# Patient Record
Sex: FEMALE | Race: WHITE | NOT HISPANIC OR LATINO | Employment: FULL TIME | URBAN - NONMETROPOLITAN AREA
[De-identification: names, ages, dates, MRNs, and addresses within clinical notes are randomized per-mention and may not be internally consistent; named-entity substitution may affect disease eponyms.]

---

## 2017-07-03 ENCOUNTER — OFFICE VISIT (OUTPATIENT)
Dept: OBSTETRICS AND GYNECOLOGY | Facility: CLINIC | Age: 47
End: 2017-07-03

## 2017-07-03 VITALS
BODY MASS INDEX: 31.23 KG/M2 | DIASTOLIC BLOOD PRESSURE: 98 MMHG | WEIGHT: 199 LBS | SYSTOLIC BLOOD PRESSURE: 110 MMHG | HEIGHT: 67 IN

## 2017-07-03 DIAGNOSIS — A63.0 GENITAL WARTS: Primary | ICD-10-CM

## 2017-07-03 PROCEDURE — 99203 OFFICE O/P NEW LOW 30 MIN: CPT | Performed by: OBSTETRICS & GYNECOLOGY

## 2017-07-03 RX ORDER — SODIUM CHLORIDE 0.9 % (FLUSH) 0.9 %
1-10 SYRINGE (ML) INJECTION AS NEEDED
Status: CANCELLED | OUTPATIENT
Start: 2017-07-12

## 2017-07-03 NOTE — H&P
[facility]  Rylie Sheridan  : 1970  MRN: [id number]  CSN: [id number]    History and Physical    Subjective   Rylie Sheridan is a 47 y.o. year old No obstetric history on file. who present for surgery due to genital and anal warts.  The patient noted the growths about 3 weeks ago.  She saw her primary care provider and was diagnosed with warts.  She is here to determine therapy..    There is no problem list on file for this patient.    Past Medical History:   Diagnosis Date   • Acute maxillary sinusitis    • Acute otitis media    • Acute serous otitis media    • Acute vaginitis    • Allergic rhinitis    • Crushing injury of hand     no fracture. swelling only      • Encounter for gynecological examination (general) (routine) without abnormal findings    • H/O mammogram 2015   • Menopausal flushing    • Rhinitis    • Temporomandibular joint pain dysfunction syndrome    • Upper respiratory infection    • Vaginal discharge      Past Surgical History:   Procedure Laterality Date   • CHOLECYSTECTOMY     • HYSTERECTOMY     • INJECTION OF MEDICATION  02/10/2016    Kenalog(3)   • NASAL SINUS SURGERY       Social History     Social History   • Marital status:      Spouse name: N/A   • Number of children: N/A   • Years of education: N/A     Social History Main Topics   • Smoking status: Former Smoker   • Smokeless tobacco: Never Used   • Alcohol use Yes      Comment: occasionally   • Drug use: No   • Sexual activity: Yes     Partners: Male     Birth control/ protection: Surgical     Other Topics Concern   • None     Social History Narrative       Current Outpatient Prescriptions:   •  azithromycin (ZITHROMAX) 250 MG tablet, Take 250 mg by mouth Daily. Take 2 tablets the first day, then 1 tablet daily for 4 days., Disp: , Rfl:   •  fluticasone (FLONASE) 50 MCG/ACT nasal spray, 2 sprays into each nostril Daily., Disp: , Rfl:     No Known Allergies    Review of Systems   Constitutional: Negative for activity  "change, appetite change, chills and fever.   Gastrointestinal: Negative for abdominal distention and abdominal pain.   Genitourinary: Negative for difficulty urinating, dysuria, flank pain, genital sores, pelvic pain, vaginal bleeding, vaginal discharge and vaginal pain.         Objective   /98  Ht 67\" (170.2 cm)  Wt 199 lb (90.3 kg)  LMP 07/03/2011  BMI 31.17 kg/m2  General: well developed; well nourished  no acute distress   Heart: regular rate and rhythm   Lungs: breathing is unlabored  clear to auscultation bilaterally   Abdomen: soft, non-tender; no masses  no umbilical or inginual hernias are present  no hepato-splenomegaly   Pelvis:: Clinical staff was present for exam  External genitalia:  normal appearance of the external genitalia including Bartholin's and [place]'s glands. There is a very thin condyloma along the right and left labia minora there is a growth at the posterior fourchette and there are multiple condyloma around the anal verge  :  urethral meatus normal; urethral hypermobility is absent.  Adnexa:  normal bimanual exam of the adnexa.  Rectal:  digital rectal exam not performed;   Labs  No results found for: HCG, PLT, HGB, HCT, WBC, NA, K, CL, CO2, BUN, CREATININE, GLUCOSE, ALBUMIN, CALCIUM, AST, ALT, BILITOT     Assessment   1. Genital and anal warts     Plan    1.  The patient will be taken to the operating room to undergo a laser ablation of her condyloma.  Today I discussed with Rylie the risks, benefits, and alternativesof  her upcoming surgical procedure. The risks that were discussed included, but  were not limited to: pain, bleeding including the need and risks of transfusion to  which she would have no objection, infection at the site of surgery, damage to  the adjacent surrounding organs,  catheter introduced urinary tract infections and  the small risk for deep vein thrombosis were all explained. Additionally, the small  risk for reoperation in the event of unanticipated " bleeding or surgical injury was  discussed.  All of her questions were answered fully.  She left with a very clear  understanding of the preoperative surgical indications and the nature of the  surgery for which she is scheduled.  She understands to be NPO after midnight.    Otoniel Penny MD  7/3/2017  2:34 PM

## 2017-07-07 ENCOUNTER — APPOINTMENT (OUTPATIENT)
Dept: PREADMISSION TESTING | Facility: HOSPITAL | Age: 47
End: 2017-07-07

## 2017-07-07 VITALS
OXYGEN SATURATION: 100 % | HEIGHT: 68 IN | RESPIRATION RATE: 16 BRPM | BODY MASS INDEX: 29.4 KG/M2 | WEIGHT: 194 LBS | HEART RATE: 84 BPM | DIASTOLIC BLOOD PRESSURE: 70 MMHG | SYSTOLIC BLOOD PRESSURE: 108 MMHG

## 2017-07-07 DIAGNOSIS — A63.0 GENITAL WARTS: ICD-10-CM

## 2017-07-07 LAB
BASOPHILS # BLD AUTO: 0.04 10*3/MM3 (ref 0–0.2)
BASOPHILS NFR BLD AUTO: 0.5 % (ref 0–2)
DEPRECATED RDW RBC AUTO: 40.5 FL (ref 36.4–46.3)
EOSINOPHIL # BLD AUTO: 0.09 10*3/MM3 (ref 0–0.7)
EOSINOPHIL NFR BLD AUTO: 1.1 % (ref 0–7)
ERYTHROCYTE [DISTWIDTH] IN BLOOD BY AUTOMATED COUNT: 12.8 % (ref 11.5–14.5)
HCT VFR BLD AUTO: 42.6 % (ref 35–45)
HGB BLD-MCNC: 14.9 G/DL (ref 12–15.5)
IMM GRANULOCYTES # BLD: 0.02 10*3/MM3 (ref 0–0.02)
IMM GRANULOCYTES NFR BLD: 0.3 % (ref 0–0.5)
LYMPHOCYTES # BLD AUTO: 2.6 10*3/MM3 (ref 0.6–4.2)
LYMPHOCYTES NFR BLD AUTO: 32.7 % (ref 10–50)
MCH RBC QN AUTO: 30.2 PG (ref 26.5–34)
MCHC RBC AUTO-ENTMCNC: 35 G/DL (ref 31.4–36)
MCV RBC AUTO: 86.2 FL (ref 80–98)
MONOCYTES # BLD AUTO: 0.63 10*3/MM3 (ref 0–0.9)
MONOCYTES NFR BLD AUTO: 7.9 % (ref 0–12)
NEUTROPHILS # BLD AUTO: 4.57 10*3/MM3 (ref 2–8.6)
NEUTROPHILS NFR BLD AUTO: 57.5 % (ref 37–80)
PLATELET # BLD AUTO: 256 10*3/MM3 (ref 150–450)
PMV BLD AUTO: 9.6 FL (ref 8–12)
RBC # BLD AUTO: 4.94 10*6/MM3 (ref 3.77–5.16)
WBC NRBC COR # BLD: 7.95 10*3/MM3 (ref 3.2–9.8)

## 2017-07-07 PROCEDURE — 36415 COLL VENOUS BLD VENIPUNCTURE: CPT

## 2017-07-07 PROCEDURE — 85025 COMPLETE CBC W/AUTO DIFF WBC: CPT | Performed by: OBSTETRICS & GYNECOLOGY

## 2017-07-07 RX ORDER — SODIUM CHLORIDE, SODIUM GLUCONATE, SODIUM ACETATE, POTASSIUM CHLORIDE, AND MAGNESIUM CHLORIDE 526; 502; 368; 37; 30 MG/100ML; MG/100ML; MG/100ML; MG/100ML; MG/100ML
1000 INJECTION, SOLUTION INTRAVENOUS CONTINUOUS PRN
Status: CANCELLED | OUTPATIENT
Start: 2017-07-12

## 2017-07-07 NOTE — DISCHARGE INSTRUCTIONS
[facility]  Pre-op Information and Guidelines    You will be called after 2 p.m. the day before your surgery (Friday for Monday surgery) and notified of your time for arrival and approximate surgery time.  If you have not received a call by 4P.M., please contact Same Day Surgery at [phone] of if outside [facility] call [phone].    Please Follow these Important Safety Guidelines:    • The morning of your procedure, take only the medications listed below with   A sip of water:_____________________________________________       ______________________________________________    • DO NOT eat or drink anything after 12:00 midnight the night before surgery  Specific instructions concerning drinking clear liquids will be discussed during  the pre-surgery instruction call the day before your surgery.    • If you take a blood thinner (ex. Plavix, Coumadin, aspirin), ask your doctor when to stop it before surgery  STOP DATE: _________________    • Only 2 visitors are allowed in patient rooms at a time  Your visitors will be asked to wait in the lobby until the admission process is complete with the exception of a parent with a child and patients in need of special assistance.    • YOU CANNOT DRIVE YOURSELF HOME  You must be accompanied by someone who will be responsible for driving you home after surgery and for your care at home.    • DO NOT chew gum, use breath mints, hard candy, or smoke the day of surgery  • DO NOT drink alcohol for at least 24 hours before your surgery  • DO NOT wear any jewelry and remove all body piercing before coming to the hospital  • DO NOT wear make-up to the hospital  • If you are having surgery on an extremity (arm/leg/foot) remove nail polish/artificial nails on the surgical side  • Clothing, glasses, contacts, dentures, and hairpieces must be removed before surgery  • Bathe the night before or the morning of your surgery and do not use powders/lotions on  skin.

## 2017-07-12 ENCOUNTER — ANESTHESIA EVENT (OUTPATIENT)
Dept: PERIOP | Facility: HOSPITAL | Age: 47
End: 2017-07-12

## 2017-07-12 ENCOUNTER — HOSPITAL ENCOUNTER (OUTPATIENT)
Facility: HOSPITAL | Age: 47
Setting detail: HOSPITAL OUTPATIENT SURGERY
Discharge: HOME | End: 2017-07-12
Attending: OBSTETRICS & GYNECOLOGY | Admitting: OBSTETRICS & GYNECOLOGY

## 2017-07-12 ENCOUNTER — ANESTHESIA (OUTPATIENT)
Dept: PERIOP | Facility: HOSPITAL | Age: 47
End: 2017-07-12

## 2017-07-12 VITALS
DIASTOLIC BLOOD PRESSURE: 77 MMHG | HEART RATE: 73 BPM | TEMPERATURE: 97.1 F | HEIGHT: 68 IN | WEIGHT: 195.33 LBS | RESPIRATION RATE: 18 BRPM | SYSTOLIC BLOOD PRESSURE: 120 MMHG | OXYGEN SATURATION: 100 % | BODY MASS INDEX: 29.6 KG/M2

## 2017-07-12 DIAGNOSIS — A63.0 GENITAL WARTS: ICD-10-CM

## 2017-07-12 PROCEDURE — 25010000002 HYDROMORPHONE PER 4 MG: Performed by: NURSE ANESTHETIST, CERTIFIED REGISTERED

## 2017-07-12 PROCEDURE — 56515 DESTROY VULVA LESION/S COMPL: CPT | Performed by: OBSTETRICS & GYNECOLOGY

## 2017-07-12 PROCEDURE — 25010000002 MIDAZOLAM PER 1 MG: Performed by: NURSE ANESTHETIST, CERTIFIED REGISTERED

## 2017-07-12 PROCEDURE — 88305 TISSUE EXAM BY PATHOLOGIST: CPT | Performed by: OBSTETRICS & GYNECOLOGY

## 2017-07-12 PROCEDURE — 88305 TISSUE EXAM BY PATHOLOGIST: CPT | Performed by: PATHOLOGY

## 2017-07-12 PROCEDURE — 25010000002 ONDANSETRON PER 1 MG: Performed by: NURSE ANESTHETIST, CERTIFIED REGISTERED

## 2017-07-12 PROCEDURE — 46917 LASER SURGERY ANAL LESIONS: CPT | Performed by: OBSTETRICS & GYNECOLOGY

## 2017-07-12 PROCEDURE — 25010000002 PROPOFOL 10 MG/ML EMULSION: Performed by: NURSE ANESTHETIST, CERTIFIED REGISTERED

## 2017-07-12 PROCEDURE — 25010000002 DEXAMETHASONE PER 1 MG: Performed by: NURSE ANESTHETIST, CERTIFIED REGISTERED

## 2017-07-12 PROCEDURE — 25010000002 FENTANYL CITRATE (PF) 100 MCG/2ML SOLUTION: Performed by: NURSE ANESTHETIST, CERTIFIED REGISTERED

## 2017-07-12 RX ORDER — PROMETHAZINE HYDROCHLORIDE 25 MG/ML
12.5 INJECTION, SOLUTION INTRAMUSCULAR; INTRAVENOUS ONCE AS NEEDED
Status: DISCONTINUED | OUTPATIENT
Start: 2017-07-12 | End: 2017-07-12 | Stop reason: HOSPADM

## 2017-07-12 RX ORDER — DEXAMETHASONE SODIUM PHOSPHATE 4 MG/ML
INJECTION, SOLUTION INTRA-ARTICULAR; INTRALESIONAL; INTRAMUSCULAR; INTRAVENOUS; SOFT TISSUE AS NEEDED
Status: DISCONTINUED | OUTPATIENT
Start: 2017-07-12 | End: 2017-07-12 | Stop reason: SURG

## 2017-07-12 RX ORDER — ONDANSETRON 2 MG/ML
4 INJECTION INTRAMUSCULAR; INTRAVENOUS ONCE AS NEEDED
Status: DISCONTINUED | OUTPATIENT
Start: 2017-07-12 | End: 2017-07-12 | Stop reason: HOSPADM

## 2017-07-12 RX ORDER — EPHEDRINE SULFATE 50 MG/ML
5 INJECTION, SOLUTION INTRAVENOUS ONCE AS NEEDED
Status: DISCONTINUED | OUTPATIENT
Start: 2017-07-12 | End: 2017-07-12 | Stop reason: HOSPADM

## 2017-07-12 RX ORDER — SODIUM CHLORIDE 0.9 % (FLUSH) 0.9 %
1-10 SYRINGE (ML) INJECTION AS NEEDED
Status: DISCONTINUED | OUTPATIENT
Start: 2017-07-12 | End: 2017-07-12 | Stop reason: HOSPADM

## 2017-07-12 RX ORDER — OXYCODONE HYDROCHLORIDE AND ACETAMINOPHEN 5; 325 MG/1; MG/1
1 TABLET ORAL ONCE AS NEEDED
Status: DISCONTINUED | OUTPATIENT
Start: 2017-07-12 | End: 2017-07-12 | Stop reason: HOSPADM

## 2017-07-12 RX ORDER — FENTANYL CITRATE 50 UG/ML
INJECTION, SOLUTION INTRAMUSCULAR; INTRAVENOUS AS NEEDED
Status: DISCONTINUED | OUTPATIENT
Start: 2017-07-12 | End: 2017-07-12 | Stop reason: SURG

## 2017-07-12 RX ORDER — IBUPROFEN 600 MG/1
600 TABLET ORAL EVERY 6 HOURS PRN
Qty: 30 TABLET | Refills: 1 | Status: SHIPPED | OUTPATIENT
Start: 2017-07-12 | End: 2017-08-03

## 2017-07-12 RX ORDER — MIDAZOLAM HYDROCHLORIDE 1 MG/ML
INJECTION INTRAMUSCULAR; INTRAVENOUS AS NEEDED
Status: DISCONTINUED | OUTPATIENT
Start: 2017-07-12 | End: 2017-07-12 | Stop reason: SURG

## 2017-07-12 RX ORDER — ACETAMINOPHEN 650 MG/1
650 SUPPOSITORY RECTAL ONCE AS NEEDED
Status: DISCONTINUED | OUTPATIENT
Start: 2017-07-12 | End: 2017-07-12 | Stop reason: HOSPADM

## 2017-07-12 RX ORDER — LABETALOL HYDROCHLORIDE 5 MG/ML
5 INJECTION, SOLUTION INTRAVENOUS
Status: DISCONTINUED | OUTPATIENT
Start: 2017-07-12 | End: 2017-07-12 | Stop reason: HOSPADM

## 2017-07-12 RX ORDER — OXYCODONE HYDROCHLORIDE AND ACETAMINOPHEN 5; 325 MG/1; MG/1
1-2 TABLET ORAL EVERY 4 HOURS PRN
Qty: 30 TABLET | Refills: 0 | Status: SHIPPED | OUTPATIENT
Start: 2017-07-12 | End: 2017-07-20

## 2017-07-12 RX ORDER — ACETAMINOPHEN 325 MG/1
650 TABLET ORAL ONCE AS NEEDED
Status: DISCONTINUED | OUTPATIENT
Start: 2017-07-12 | End: 2017-07-12 | Stop reason: HOSPADM

## 2017-07-12 RX ORDER — SODIUM CHLORIDE, SODIUM GLUCONATE, SODIUM ACETATE, POTASSIUM CHLORIDE, AND MAGNESIUM CHLORIDE 526; 502; 368; 37; 30 MG/100ML; MG/100ML; MG/100ML; MG/100ML; MG/100ML
1000 INJECTION, SOLUTION INTRAVENOUS CONTINUOUS PRN
Status: DISCONTINUED | OUTPATIENT
Start: 2017-07-12 | End: 2017-07-12 | Stop reason: HOSPADM

## 2017-07-12 RX ORDER — ONDANSETRON 2 MG/ML
INJECTION INTRAMUSCULAR; INTRAVENOUS AS NEEDED
Status: DISCONTINUED | OUTPATIENT
Start: 2017-07-12 | End: 2017-07-12 | Stop reason: SURG

## 2017-07-12 RX ORDER — IBUPROFEN 800 MG/1
800 TABLET ORAL EVERY 6 HOURS
Status: DISCONTINUED | OUTPATIENT
Start: 2017-07-12 | End: 2017-07-12 | Stop reason: HOSPADM

## 2017-07-12 RX ORDER — LIDOCAINE HYDROCHLORIDE 20 MG/ML
INJECTION, SOLUTION INFILTRATION; PERINEURAL AS NEEDED
Status: DISCONTINUED | OUTPATIENT
Start: 2017-07-12 | End: 2017-07-12 | Stop reason: SURG

## 2017-07-12 RX ORDER — NALOXONE HCL 0.4 MG/ML
0.2 VIAL (ML) INJECTION AS NEEDED
Status: DISCONTINUED | OUTPATIENT
Start: 2017-07-12 | End: 2017-07-12 | Stop reason: HOSPADM

## 2017-07-12 RX ORDER — DIPHENHYDRAMINE HYDROCHLORIDE 50 MG/ML
12.5 INJECTION INTRAMUSCULAR; INTRAVENOUS
Status: DISCONTINUED | OUTPATIENT
Start: 2017-07-12 | End: 2017-07-12 | Stop reason: HOSPADM

## 2017-07-12 RX ORDER — PROPOFOL 10 MG/ML
VIAL (ML) INTRAVENOUS AS NEEDED
Status: DISCONTINUED | OUTPATIENT
Start: 2017-07-12 | End: 2017-07-12 | Stop reason: SURG

## 2017-07-12 RX ORDER — FLUMAZENIL 0.1 MG/ML
0.2 INJECTION INTRAVENOUS AS NEEDED
Status: DISCONTINUED | OUTPATIENT
Start: 2017-07-12 | End: 2017-07-12 | Stop reason: HOSPADM

## 2017-07-12 RX ADMIN — SODIUM CHLORIDE, SODIUM GLUCONATE, SODIUM ACETATE, POTASSIUM CHLORIDE, AND MAGNESIUM CHLORIDE 1000 ML: 526; 502; 368; 37; 30 INJECTION, SOLUTION INTRAVENOUS at 07:28

## 2017-07-12 RX ADMIN — MIDAZOLAM 2 MG: 1 INJECTION INTRAMUSCULAR; INTRAVENOUS at 09:17

## 2017-07-12 RX ADMIN — ONDANSETRON 4 MG: 2 INJECTION INTRAMUSCULAR; INTRAVENOUS at 09:44

## 2017-07-12 RX ADMIN — PROPOFOL 150 MG: 10 INJECTION, EMULSION INTRAVENOUS at 09:24

## 2017-07-12 RX ADMIN — HYDROMORPHONE HYDROCHLORIDE 0.5 MG: 1 INJECTION, SOLUTION INTRAMUSCULAR; INTRAVENOUS; SUBCUTANEOUS at 10:16

## 2017-07-12 RX ADMIN — DEXAMETHASONE SODIUM PHOSPHATE 4 MG: 4 INJECTION, SOLUTION INTRAMUSCULAR; INTRAVENOUS at 09:27

## 2017-07-12 RX ADMIN — FENTANYL CITRATE 100 MCG: 50 INJECTION, SOLUTION INTRAMUSCULAR; INTRAVENOUS at 09:19

## 2017-07-12 RX ADMIN — LIDOCAINE HYDROCHLORIDE 80 MG: 20 INJECTION, SOLUTION INFILTRATION; PERINEURAL at 09:24

## 2017-07-12 NOTE — PLAN OF CARE
Problem: Patient Care Overview (Adult)  Goal: Adult Individualization and Mutuality  Outcome: Outcome(s) achieved Date Met:  07/12/17  Goal: Discharge Needs Assessment  Outcome: Outcome(s) achieved Date Met:  07/12/17

## 2017-07-12 NOTE — ANESTHESIA POSTPROCEDURE EVALUATION
Patient: Rylie Sheridan    Procedure Summary     Date Anesthesia Start Anesthesia Stop Room / Location    07/12/17 0917 0950  [facility] OR 10 / [facility]       Procedure Diagnosis Surgeon Provider    LASER ABLTION OF CONDYLOMA (N/A Vagina) Genital warts  (Genital warts [A63.0]) MD Michelle Mojica MD          Anesthesia Type: general  Last vitals  BP      Temp      Pulse     Resp      SpO2        Post Anesthesia Care and Evaluation    Patient location during evaluation: PACU  Patient participation: complete - patient participated  Level of consciousness: awake and awake and alert  Pain management: adequate  Airway patency: patent  Anesthetic complications: No anesthetic complications  PONV Status: none  Cardiovascular status: acceptable  Respiratory status: acceptable  Hydration status: acceptable

## 2017-07-12 NOTE — H&P (VIEW-ONLY)
[facility]  Rylie Sheridan  : 1970  MRN: [id number]  CSN: [id number]    History and Physical    Subjective   Rylie Sheridan is a 47 y.o. year old No obstetric history on file. who present for surgery due to genital and anal warts.  The patient noted the growths about 3 weeks ago.  She saw her primary care provider and was diagnosed with warts.  She is here to determine therapy..    There is no problem list on file for this patient.    Past Medical History:   Diagnosis Date   • Acute maxillary sinusitis    • Acute otitis media    • Acute serous otitis media    • Acute vaginitis    • Allergic rhinitis    • Crushing injury of hand     no fracture. swelling only      • Encounter for gynecological examination (general) (routine) without abnormal findings    • H/O mammogram 2015   • Menopausal flushing    • Rhinitis    • Temporomandibular joint pain dysfunction syndrome    • Upper respiratory infection    • Vaginal discharge      Past Surgical History:   Procedure Laterality Date   • CHOLECYSTECTOMY     • HYSTERECTOMY     • INJECTION OF MEDICATION  02/10/2016    Kenalog(3)   • NASAL SINUS SURGERY       Social History     Social History   • Marital status:      Spouse name: N/A   • Number of children: N/A   • Years of education: N/A     Social History Main Topics   • Smoking status: Former Smoker   • Smokeless tobacco: Never Used   • Alcohol use Yes      Comment: occasionally   • Drug use: No   • Sexual activity: Yes     Partners: Male     Birth control/ protection: Surgical     Other Topics Concern   • None     Social History Narrative       Current Outpatient Prescriptions:   •  azithromycin (ZITHROMAX) 250 MG tablet, Take 250 mg by mouth Daily. Take 2 tablets the first day, then 1 tablet daily for 4 days., Disp: , Rfl:   •  fluticasone (FLONASE) 50 MCG/ACT nasal spray, 2 sprays into each nostril Daily., Disp: , Rfl:     No Known Allergies    Review of Systems   Constitutional: Negative for activity  "change, appetite change, chills and fever.   Gastrointestinal: Negative for abdominal distention and abdominal pain.   Genitourinary: Negative for difficulty urinating, dysuria, flank pain, genital sores, pelvic pain, vaginal bleeding, vaginal discharge and vaginal pain.         Objective   /98  Ht 67\" (170.2 cm)  Wt 199 lb (90.3 kg)  LMP 07/03/2011  BMI 31.17 kg/m2  General: well developed; well nourished  no acute distress   Heart: regular rate and rhythm   Lungs: breathing is unlabored  clear to auscultation bilaterally   Abdomen: soft, non-tender; no masses  no umbilical or inginual hernias are present  no hepato-splenomegaly   Pelvis:: Clinical staff was present for exam  External genitalia:  normal appearance of the external genitalia including Bartholin's and [place]'s glands. There is a very thin condyloma along the right and left labia minora there is a growth at the posterior fourchette and there are multiple condyloma around the anal verge  :  urethral meatus normal; urethral hypermobility is absent.  Adnexa:  normal bimanual exam of the adnexa.  Rectal:  digital rectal exam not performed;   Labs  No results found for: HCG, PLT, HGB, HCT, WBC, NA, K, CL, CO2, BUN, CREATININE, GLUCOSE, ALBUMIN, CALCIUM, AST, ALT, BILITOT     Assessment   1. Genital and anal warts     Plan    1.  The patient will be taken to the operating room to undergo a laser ablation of her condyloma.  Today I discussed with Rylie the risks, benefits, and alternativesof  her upcoming surgical procedure. The risks that were discussed included, but  were not limited to: pain, bleeding including the need and risks of transfusion to  which she would have no objection, infection at the site of surgery, damage to  the adjacent surrounding organs,  catheter introduced urinary tract infections and  the small risk for deep vein thrombosis were all explained. Additionally, the small  risk for reoperation in the event of unanticipated " bleeding or surgical injury was  discussed.  All of her questions were answered fully.  She left with a very clear  understanding of the preoperative surgical indications and the nature of the  surgery for which she is scheduled.  She understands to be NPO after midnight.    Otoniel Penny MD  7/3/2017  2:34 PM

## 2017-07-12 NOTE — OP NOTE
Problem diagnosis: Genital and perianal warts    Postoperative diagnosis: same    Procedure: Laser ablation of condyloma    Surgeon: Otoniel Penny M.D.    Assistant: Barbi Tipton CSA    Anesthesia: Gen. by LMA    Counts: Correct    Complications: None    Specimens: Vulvar and perianal condyloma sent to pathology    Findings: Large amount of condyloma in the perianal region several smaller condyloma in the posterior fourchette 1 beneath the clitoris and several around the clitoral barrera    Technique: After informed consent was obtained, and after the operative consent was signed in order, the patient was brought to the operating room and placed on the operating room table in the supine position in preparation for her surgery.  After an adequate level of general anesthesia was noted, [facility]  A timeout was performed.  Moistened towels were then placed around the operative site.  4 condyloma within grasped with pickups and excised using the needlepoint Bovie.  These were sent to pathology.  The remaining condyloma were then ablated using the laser, with the laser set on 10.  Once this was completed, Silvadene cream was applied to the burnt areas.  The patient tolerates the procedure well and will go to the recovery room in stable condition.          This document has been electronically signed by Otoniel Penny MD on July 12, 2017 9:59 AM

## 2017-07-12 NOTE — PLAN OF CARE
Problem: Patient Care Overview (Adult)  Goal: Plan of Care Review  Outcome: Outcome(s) achieved Date Met:  07/12/17 07/12/17 1002   Coping/Psychosocial Response Interventions   Plan Of Care Reviewed With patient   Patient Care Overview   Progress progress toward functional goals as expected   Outcome Evaluation   Outcome Summary/Follow up Plan No c/o of pain. VSS. Meets pacu d/c criteria.         Problem: Perioperative Period (Adult)  Goal: Signs and Symptoms of Listed Potential Problems Will be Absent or Manageable (Perioperative Period)  Outcome: Outcome(s) achieved Date Met:  07/12/17 07/12/17 1002   Perioperative Period   Problems Assessed (Perioperative Period) all   Problems Present (Perioperative Period) none

## 2017-07-12 NOTE — BRIEF OP NOTE
LOOP ELECTROCAUTERY EXCISION PROCEDURE BIOPSY OF CERVIX WITH/WITHOUT LASER  Procedure Note    Rylie Sheridan  7/12/2017    Pre-op Diagnosis:   Genital warts [A63.0]    Post-op Diagnosis:     Post-Op Diagnosis Codes:     * Genital warts [A63.0]    Procedure/CPT® Codes:      Procedure(s):  LASER ABLTION OF CONDYLOMA    Surgeon(s):  Otoniel Penny MD    Anesthesia: General    Staff:   Circulator: Julee Motta RN; Kely Barclay RN  Laser Staff: Angeli Atwood RN  Scrub Person: Main Alejandre  Assistant: Barbi Tipton CSA    Estimated Blood Loss: *No blood loss documented*  Urine Voided: * No values recorded between 7/12/2017  9:16 AM and 7/12/2017  9:39 AM *    Specimens:                  ID Type Source Tests Collected by Time Destination   A : vulvar andper-anal condylomas Tissue Condyloma TISSUE EXAM Otoniel Penny MD 7/12/2017 0936          Drains:           Findings: see op note    Complications: none      Otoniel Penny MD     Date: 7/12/2017  Time: 9:42 AM

## 2017-07-12 NOTE — ANESTHESIA PROCEDURE NOTES
Airway  Urgency: elective    Airway not difficult    General Information and Staff    Patient location during procedure: OR  CRNA: JOSHUA CISNEROS    Indications and Patient Condition    Preoxygenated: yes  Mask difficulty assessment: 0 - not attempted    Final Airway Details  Final airway type: supraglottic airway      Successful airway: classic  Size 4    Number of attempts at approach: 1    Additional Comments  Lips and teeth in preanesthetic condition

## 2017-07-13 LAB
LAB AP CASE REPORT: NORMAL
Lab: NORMAL
PATH REPORT.FINAL DX SPEC: NORMAL
PATH REPORT.GROSS SPEC: NORMAL

## 2017-07-20 ENCOUNTER — OFFICE VISIT (OUTPATIENT)
Dept: OBSTETRICS AND GYNECOLOGY | Facility: CLINIC | Age: 47
End: 2017-07-20

## 2017-07-20 DIAGNOSIS — Z09 SURGICAL FOLLOW-UP CARE: Primary | ICD-10-CM

## 2017-07-20 PROCEDURE — 99024 POSTOP FOLLOW-UP VISIT: CPT | Performed by: OBSTETRICS & GYNECOLOGY

## 2017-07-20 NOTE — PROGRESS NOTES
The patient resents for 1 week postop check following laser ablation of condyloma.  She's doing well and has no complaints.    On exam: In general well-developed well-nourished female in no acute distress  External genitalia: The ablation sites appear to be healing up well    Assessment: Normal 1 week postop check    Plan: recheck in 2 weeks          This document has been electronically signed by Otoniel Penny MD on July 20, 2017 2:02 PM

## 2017-08-03 ENCOUNTER — OFFICE VISIT (OUTPATIENT)
Dept: OBSTETRICS AND GYNECOLOGY | Facility: CLINIC | Age: 47
End: 2017-08-03

## 2017-08-03 DIAGNOSIS — Z09 SURGICAL FOLLOW-UP CARE: Primary | ICD-10-CM

## 2017-08-03 PROCEDURE — 99024 POSTOP FOLLOW-UP VISIT: CPT | Performed by: OBSTETRICS & GYNECOLOGY

## 2017-08-03 NOTE — PROGRESS NOTES
The patient presents for a three-week postop check following laser ablation of condyloma.  She's doing well but thinks she has a new condyloma growing.    On exam in general well-developed well-nourished female in no acute distress   External genitalia: The ablation sites are healing well and the anal area that is a possible new condyloma at the 3 o'clock position    Assessment: Three-week postoperative check    Plan: Recheck in a month if it appears to condyloma has returned, we could apply acid at that time.          This document has been electronically signed by Otoniel Penny MD on August 3, 2017 3:20 PM

## 2017-08-29 ENCOUNTER — OFFICE VISIT (OUTPATIENT)
Dept: OBSTETRICS AND GYNECOLOGY | Facility: CLINIC | Age: 47
End: 2017-08-29

## 2017-08-29 DIAGNOSIS — A63.0 GENITAL WARTS: ICD-10-CM

## 2017-08-29 DIAGNOSIS — Z09 SURGICAL FOLLOW-UP CARE: Primary | ICD-10-CM

## 2017-08-29 PROCEDURE — 99024 POSTOP FOLLOW-UP VISIT: CPT | Performed by: OBSTETRICS & GYNECOLOGY

## 2017-08-29 RX ORDER — IMIQUIMOD 12.5 MG/.25G
CREAM TOPICAL 3 TIMES WEEKLY
Qty: 12 PACKET | Refills: 2 | Status: SHIPPED | OUTPATIENT
Start: 2017-08-30 | End: 2017-11-19 | Stop reason: SDUPTHER

## 2017-08-29 NOTE — PROGRESS NOTES
The patient presents for 6 week postop check following laser ablation of condyloma.  She notes she has new condyloma present.    On exam: In general, a well-developed well-nourished female in no acute distress  External genitalia.  The ablation sites of completely healed up, she has new condyloma in multiple locations present about the anus.    Assessment: Six-week postoperative check with new condyloma    Plan: The patient would like to try Aldara cream at home.  Follow-up as needed.          This document has been electronically signed by Otoniel Penny MD on August 29, 2017 3:27 PM

## 2017-11-20 RX ORDER — IMIQUIMOD 12.5 MG/.25G
CREAM TOPICAL
Qty: 12 G | Refills: 0 | Status: SHIPPED | OUTPATIENT
Start: 2017-11-20 | End: 2017-12-10 | Stop reason: SDUPTHER

## 2017-12-11 RX ORDER — IMIQUIMOD 12.5 MG/.25G
CREAM TOPICAL
Qty: 12 G | Refills: 0 | Status: SHIPPED | OUTPATIENT
Start: 2017-12-11 | End: 2021-09-10

## 2021-09-10 ENCOUNTER — OFFICE VISIT (OUTPATIENT)
Dept: FAMILY MEDICINE CLINIC | Facility: CLINIC | Age: 51
End: 2021-09-10

## 2021-09-10 VITALS
OXYGEN SATURATION: 98 % | HEART RATE: 80 BPM | WEIGHT: 215.6 LBS | RESPIRATION RATE: 18 BRPM | BODY MASS INDEX: 32.67 KG/M2 | DIASTOLIC BLOOD PRESSURE: 74 MMHG | TEMPERATURE: 97.1 F | SYSTOLIC BLOOD PRESSURE: 120 MMHG | HEIGHT: 68 IN

## 2021-09-10 DIAGNOSIS — Z12.31 BREAST CANCER SCREENING BY MAMMOGRAM: ICD-10-CM

## 2021-09-10 DIAGNOSIS — Z00.00 ROUTINE GENERAL MEDICAL EXAMINATION AT A HEALTH CARE FACILITY: Primary | ICD-10-CM

## 2021-09-10 DIAGNOSIS — Z78.0 MENOPAUSE: ICD-10-CM

## 2021-09-10 PROCEDURE — 99386 PREV VISIT NEW AGE 40-64: CPT | Performed by: FAMILY MEDICINE

## 2021-09-10 RX ORDER — ESTRADIOL 2 MG/1
2 RING VAGINAL
Qty: 3 EACH | Refills: 0 | Status: SHIPPED | OUTPATIENT
Start: 2021-09-10 | End: 2021-09-27 | Stop reason: ALTCHOICE

## 2021-09-10 RX ORDER — ESTRADIOL 2 MG/1
RING VAGINAL
COMMUNITY
Start: 2021-08-31 | End: 2021-09-10 | Stop reason: SDUPTHER

## 2021-09-10 NOTE — PATIENT INSTRUCTIONS
Mediterranean Diet  A Mediterranean diet refers to food and lifestyle choices that are based on the traditions of countries located on the [place]. This way of eating has been shown to help prevent certain conditions and improve outcomes for people who have chronic diseases, like kidney disease and heart disease.  What are tips for following this plan?  Lifestyle  · Cook and eat meals together with your family, when possible.  · Drink enough fluid to keep your urine clear or pale yellow.  · Be physically active every day. This includes:  ? Aerobic exercise like running or swimming.  ? Leisure activities like gardening, walking, or housework.  · Get 7-8 hours of sleep each night.  · If recommended by your health care provider, drink red wine in moderation. This means 1 glass a day for nonpregnant women and 2 glasses a day for men. A glass of wine equals 5 oz (150 mL).  Reading food labels    · Check the serving size of packaged foods. For foods such as rice and pasta, the serving size refers to the amount of cooked product, not dry.  · Check the total fat in packaged foods. Avoid foods that have saturated fat or trans fats.  · Check the ingredients list for added sugars, such as corn syrup.    Shopping  · At the grocery store, buy most of your food from the areas near the walls of the store. This includes:  ? Fresh fruits and vegetables (produce).  ? Grains, beans, nuts, and seeds. Some of these may be available in unpackaged forms or large amounts (in bulk).  ? Fresh seafood.  ? Poultry and eggs.  ? Low-fat dairy products.  · Buy whole ingredients instead of prepackaged foods.  · Buy fresh fruits and vegetables in-season from local farmers markets.  · Buy frozen fruits and vegetables in resealable bags.  · If you do not have access to quality fresh seafood, buy precooked frozen shrimp or canned fish, such as tuna, salmon, or sardines.  · Buy small amounts of raw or cooked vegetables, salads, or olives from  the deli or salad bar at your store.  · Stock your pantry so you always have certain foods on hand, such as olive oil, canned tuna, canned tomatoes, rice, pasta, and beans.  Cooking  · Cook foods with extra-virgin olive oil instead of using butter or other vegetable oils.  · Have meat as a side dish, and have vegetables or grains as your main dish. This means having meat in small portions or adding small amounts of meat to foods like pasta or stew.  · Use beans or vegetables instead of meat in common dishes like chili or lasagna.  · [place] with different cooking methods. Try roasting or broiling vegetables instead of steaming or sautéeing them.  · Add frozen vegetables to soups, stews, pasta, or rice.  · Add nuts or seeds for added healthy fat at each meal. You can add these to yogurt, salads, or vegetable dishes.  · Marinate fish or vegetables using olive oil, lemon juice, garlic, and fresh herbs.  Meal planning    · Plan to eat 1 vegetarian meal one day each week. Try to work up to 2 vegetarian meals, if possible.  · Eat seafood 2 or more times a week.  · Have healthy snacks readily available, such as:  ? Vegetable sticks with hummus.  ? Greek yogurt.  ? Fruit and nut trail mix.  · Eat balanced meals throughout the week. This includes:  ? Fruit: 2-3 servings a day  ? Vegetables: 4-5 servings a day  ? Low-fat dairy: 2 servings a day  ? Fish, poultry, or lean meat: 1 serving a day  ? Beans and legumes: 2 or more servings a week  ? Nuts and seeds: 1-2 servings a day  ? Whole grains: 6-8 servings a day  ? Extra-virgin olive oil: 3-4 servings a day  · Limit red meat and sweets to only a few servings a month    What are my food choices?  · Mediterranean diet  ? Recommended  § Grains: Whole-grain pasta. Brown rice. Bulgar wheat. Polenta. Couscous. Whole-wheat bread. Oatmeal. Quinoa.  § Vegetables: Artichokes. Beets. Broccoli. Cabbage. Carrots. Eggplant. Green beans. Chard. Kale. Spinach. Onions. Leeks. Peas. Squash.  Tomatoes. Peppers. Radishes.  § Fruits: Apples. Apricots. Avocado. Berries. Bananas. Cherries. Dates. Figs. Grapes. [place]. Melon. Oranges. Peaches. Plums. Pomegranate.  § Meats and other protein foods: Beans. Almonds. Sunflower seeds. Pine nuts. Peanuts. Cod. [place]. Scallops. Shrimp. Tuna. Tilapia. Clams. Oysters. Eggs.  § Dairy: Low-fat milk. Cheese. Greek yogurt.  § Beverages: Water. Red wine. Herbal tea.  § Fats and oils: Extra virgin olive oil. Avocado oil. Grape seed oil.  § Sweets and desserts: Greek yogurt with honey. Baked apples. Poached pears. Trail mix.  § Seasoning and other foods: Basil. Cilantro. Coriander. Cumin. Mint. Parsley. Joni. Rosemary. Tarragon. Garlic. Oregano. Thyme. Pepper. Balsalmic vinegar. Tahini. Hummus. Tomato sauce. Olives. Mushrooms.  ? Limit these  § Grains: Prepackaged pasta or rice dishes. Prepackaged cereal with added sugar.  § Vegetables: Deep fried potatoes (french fries).  § Fruits: Fruit canned in syrup.  § Meats and other protein foods: Beef. Pork. Lamb. Poultry with skin. Hot dogs. [place].  § Dairy: Ice cream. Sour cream. Whole milk.  § Beverages: Juice. Sugar-sweetened soft drinks. Beer. Liquor and spirits.  § Fats and oils: Butter. Canola oil. Vegetable oil. Beef fat (tallow). Lard.  § Sweets and desserts: Cookies. Cakes. Pies. Candy.  § Seasoning and other foods: Mayonnaise. Premade sauces and marinades.  The items listed may not be a complete list. Talk with your dietitian about what dietary choices are right for you.  Summary  · The Mediterranean diet includes both food and lifestyle choices.  · Eat a variety of fresh fruits and vegetables, beans, nuts, seeds, and whole grains.  · Limit the amount of red meat and sweets that you eat.  · Talk with your health care provider about whether it is safe for you to drink red wine in moderation. This means 1 glass a day for nonpregnant women and 2 glasses a day for men. A glass of wine equals 5 oz (150 mL).  This information  is not intended to replace advice given to you by your health care provider. Make sure you discuss any questions you have with your health care provider.  Document Revised: 08/17/2017 Document Reviewed: 08/10/2017  [employer] Patient Education © 2020 [employer]  Exercising to Stay Healthy  To become healthy and stay healthy, it is recommended that you do moderate-intensity and vigorous-intensity exercise. You can tell that you are exercising at a moderate intensity if your heart starts beating faster and you start breathing faster but can still hold a conversation. You can tell that you are exercising at a vigorous intensity if you are breathing much harder and faster and cannot hold a conversation while exercising.  Exercising regularly is important. It has many health benefits, such as:  · Improving overall fitness, flexibility, and endurance.  · Increasing bone density.  · Helping with weight control.  · Decreasing body fat.  · Increasing muscle strength.  · Reducing stress and tension.  · Improving overall health.  How often should I exercise?  Choose an activity that you enjoy, and set realistic goals. Your health care provider can help you make an activity plan that works for you.  Exercise regularly as told by your health care provider. This may include:  · Doing strength training two times a week, such as:  ? Lifting weights.  ? Using resistance bands.  ? Push-ups.  ? Sit-ups.  ? Yoga.  · Doing a certain intensity of exercise for a given amount of time. Choose from these options:  ? A total of 150 minutes of moderate-intensity exercise every week.  ? A total of 75 minutes of vigorous-intensity exercise every week.  ? A mix of moderate-intensity and vigorous-intensity exercise every week.  Children, pregnant women, people who have not exercised regularly, people who are overweight, and older adults may need to talk with a health care provider about what activities are safe to do. If you have a medical  condition, be sure to talk with your health care provider before you start a new exercise program.  What are some exercise ideas?  Moderate-intensity exercise ideas include:  · Walking 1 mile (1.6 km) in about 15 minutes.  · Biking.  · Hiking.  · Golfing.  · Dancing.  · Water aerobics.  Vigorous-intensity exercise ideas include:  · Walking 4.5 miles (7.2 km) or more in about 1 hour.  · Jogging or running 5 miles (8 km) in about 1 hour.  · Biking 10 miles (16.1 km) or more in about 1 hour.  · Lap swimming.  · Roller-skating or in-line skating.  · Cross-country skiing.  · Vigorous competitive sports, such as football, basketball, and soccer.  · Jumping rope.  · Aerobic dancing.  What are some everyday activities that can help me to get exercise?  · Yard work, such as:  ? Pushing a .  ? Raking and bagging leaves.  · Washing your car.  · Pushing a stroller.  · Shoveling snow.  · Gardening.  · Washing windows or floors.  How can I be more active in my day-to-day activities?  · Use stairs instead of an elevator.  · Take a walk during your lunch break.  · If you drive, park your car farther away from your work or school.  · If you take public transportation, get off one stop early and walk the rest of the way.  · Stand up or walk around during all of your indoor phone calls.  · Get up, stretch, and walk around every 30 minutes throughout the day.  · Enjoy exercise with a friend. Support to continue exercising will help you keep a regular routine of activity.  What guidelines can I follow while exercising?  · Before you start a new exercise program, talk with your health care provider.  · Do not exercise so much that you hurt yourself, feel dizzy, or get very short of breath.  · Wear comfortable clothes and wear shoes with good support.  · Drink plenty of water while you exercise to prevent dehydration or heat stroke.  · Work out until your breathing and your heartbeat get faster.  Where to find more  information  · [place] Department of Health and Human Services: www.hhs.gov  · Centers for Disease Control and Prevention (CDC): www.cdc.gov  Summary  · Exercising regularly is important. It will improve your overall fitness, flexibility, and endurance.  · Regular exercise also will improve your overall health. It can help you control your weight, reduce stress, and improve your bone density.  · Do not exercise so much that you hurt yourself, feel dizzy, or get very short of breath.  · Before you start a new exercise program, talk with your health care provider.  This information is not intended to replace advice given to you by your health care provider. Make sure you discuss any questions you have with your health care provider.  Document Revised: 11/30/2018 Document Reviewed: 11/08/2018  [employer] Patient Education © 2021 [employer]

## 2021-09-10 NOTE — PROGRESS NOTES
Subjective   Rylie Salazar is a 51 y.o. female.     51-year-old female perimenopausal, due a mammogram and complete physical      The following portions of the patient's history were reviewed and updated as appropriate: allergies, current medications, past family history, past medical history, past social history, past surgical history and problem list.    Review of Systems   Cardiovascular: Negative for chest pain and leg swelling.   Genitourinary: Negative for vaginal discharge.   Neurological: Negative for headaches.       Objective   Physical Exam  Vitals and nursing note reviewed.   Constitutional:       Appearance: She is obese.   Neck:      Vascular: No carotid bruit.   Cardiovascular:      Rate and Rhythm: Normal rate and regular rhythm.      Pulses: Normal pulses.      Heart sounds: Normal heart sounds.   Pulmonary:      Effort: Pulmonary effort is normal.      Breath sounds: Normal breath sounds.   Abdominal:      General: Abdomen is flat.      Palpations: Abdomen is soft.   Musculoskeletal:      Right lower leg: No edema.      Left lower leg: No edema.   Lymphadenopathy:      Cervical: No cervical adenopathy.   Neurological:      General: No focal deficit present.      Mental Status: She is alert and oriented to person, place, and time.   Psychiatric:         Mood and Affect: Mood normal.         Behavior: Behavior normal.         Thought Content: Thought content normal.         Judgment: Judgment normal.         Assessment/Plan   Diagnoses and all orders for this visit:    1. Routine general medical examination at a health care facility (Primary)  -     TSH  -     T4, free  -     CBC & Differential  -     Comprehensive Metabolic Panel  -     Lipid Panel  -     Hepatitis C Antibody    2. Breast cancer screening by mammogram  -     Mammo Screening Digital Tomosynthesis Bilateral With CAD; Future    3. Menopause  -     FSH & LH    Other orders  -     estradiol (Estring) 2 MG vaginal ring; Insert 2 mg into  the vagina Every 3 (Three) Months for 91 days. follow package directions  Dispense: 3 each; Refill: 0         Plan above-return for complete physical and possible hormonal supplements after mammogram etc. patient will need Pap smear and vaginal smear

## 2021-09-11 LAB
ALBUMIN SERPL-MCNC: 4.6 G/DL (ref 3.5–5.2)
ALBUMIN/GLOB SERPL: 2.1 G/DL
ALP SERPL-CCNC: 93 U/L (ref 39–117)
ALT SERPL-CCNC: 37 U/L (ref 1–33)
AST SERPL-CCNC: 29 U/L (ref 1–32)
BASOPHILS # BLD AUTO: 0.08 10*3/MM3 (ref 0–0.2)
BASOPHILS NFR BLD AUTO: 1 % (ref 0–1.5)
BILIRUB SERPL-MCNC: 0.4 MG/DL (ref 0–1.2)
BUN SERPL-MCNC: 10 MG/DL (ref 6–20)
BUN/CREAT SERPL: 12.7 (ref 7–25)
CALCIUM SERPL-MCNC: 9.5 MG/DL (ref 8.6–10.5)
CHLORIDE SERPL-SCNC: 101 MMOL/L (ref 98–107)
CHOLEST SERPL-MCNC: 211 MG/DL (ref 0–200)
CO2 SERPL-SCNC: 28.7 MMOL/L (ref 22–29)
CREAT SERPL-MCNC: 0.79 MG/DL (ref 0.57–1)
EOSINOPHIL # BLD AUTO: 0.13 10*3/MM3 (ref 0–0.4)
EOSINOPHIL NFR BLD AUTO: 1.7 % (ref 0.3–6.2)
ERYTHROCYTE [DISTWIDTH] IN BLOOD BY AUTOMATED COUNT: 12.2 % (ref 12.3–15.4)
FSH SERPL-ACNC: 84.5 MIU/ML
GLOBULIN SER CALC-MCNC: 2.2 GM/DL
GLUCOSE SERPL-MCNC: 79 MG/DL (ref 65–99)
HCT VFR BLD AUTO: 46.4 % (ref 34–46.6)
HCV AB S/CO SERPL IA: <0.1 S/CO RATIO (ref 0–0.9)
HDLC SERPL-MCNC: 58 MG/DL (ref 40–60)
HGB BLD-MCNC: 14.9 G/DL (ref 12–15.9)
IMM GRANULOCYTES # BLD AUTO: 0.02 10*3/MM3 (ref 0–0.05)
IMM GRANULOCYTES NFR BLD AUTO: 0.3 % (ref 0–0.5)
LDLC SERPL CALC-MCNC: 134 MG/DL (ref 0–100)
LH SERPL-ACNC: 76.2 MIU/ML
LYMPHOCYTES # BLD AUTO: 2.39 10*3/MM3 (ref 0.7–3.1)
LYMPHOCYTES NFR BLD AUTO: 30.8 % (ref 19.6–45.3)
MCH RBC QN AUTO: 30 PG (ref 26.6–33)
MCHC RBC AUTO-ENTMCNC: 32.1 G/DL (ref 31.5–35.7)
MCV RBC AUTO: 93.5 FL (ref 79–97)
MONOCYTES # BLD AUTO: 0.57 10*3/MM3 (ref 0.1–0.9)
MONOCYTES NFR BLD AUTO: 7.3 % (ref 5–12)
NEUTROPHILS # BLD AUTO: 4.57 10*3/MM3 (ref 1.7–7)
NEUTROPHILS NFR BLD AUTO: 58.9 % (ref 42.7–76)
NRBC BLD AUTO-RTO: 0 /100 WBC (ref 0–0.2)
PLATELET # BLD AUTO: 328 10*3/MM3 (ref 140–450)
POTASSIUM SERPL-SCNC: 4 MMOL/L (ref 3.5–5.2)
PROT SERPL-MCNC: 6.8 G/DL (ref 6–8.5)
RBC # BLD AUTO: 4.96 10*6/MM3 (ref 3.77–5.28)
SODIUM SERPL-SCNC: 139 MMOL/L (ref 136–145)
T4 FREE SERPL-MCNC: 1.6 NG/DL (ref 0.93–1.7)
TRIGL SERPL-MCNC: 107 MG/DL (ref 0–150)
TSH SERPL DL<=0.005 MIU/L-ACNC: 1.19 UIU/ML (ref 0.27–4.2)
VLDLC SERPL CALC-MCNC: 19 MG/DL (ref 5–40)
WBC # BLD AUTO: 7.76 10*3/MM3 (ref 3.4–10.8)

## 2021-09-13 ENCOUNTER — TELEPHONE (OUTPATIENT)
Dept: FAMILY MEDICINE CLINIC | Facility: CLINIC | Age: 51
End: 2021-09-13

## 2021-09-17 ENCOUNTER — PATIENT ROUNDING (BHMG ONLY) (OUTPATIENT)
Dept: FAMILY MEDICINE CLINIC | Facility: CLINIC | Age: 51
End: 2021-09-17

## 2021-09-17 NOTE — PROGRESS NOTES
September 17, 2021    Hello, may I speak with Rylie LILA Salazar?    My name is Dian     I am  with [facility]  [address], [address]  [place] [address] [phone].    Before we get started may I verify your date of birth? 1970    I am calling to officially welcome you to our practice and ask about your recent visit. Is this a good time to talk? yes    Tell me about your visit with us. What things went well?  Patient stated everything was great and she had no issues.        We're always looking for ways to make our patients' experiences even better. Do you have recommendations on ways we may improve?  no    Overall were you satisfied with your first visit to our practice? yes       I appreciate you taking the time to speak with me today. Is there anything else I can do for you? no      Thank you, and have a great day.

## 2021-09-24 DIAGNOSIS — R74.8 ELEVATED LIVER ENZYMES: Primary | ICD-10-CM

## 2021-09-27 ENCOUNTER — OFFICE VISIT (OUTPATIENT)
Dept: FAMILY MEDICINE CLINIC | Facility: CLINIC | Age: 51
End: 2021-09-27

## 2021-09-27 VITALS
BODY MASS INDEX: 32.49 KG/M2 | WEIGHT: 214.4 LBS | RESPIRATION RATE: 16 BRPM | TEMPERATURE: 97.8 F | HEART RATE: 98 BPM | HEIGHT: 68 IN | OXYGEN SATURATION: 98 % | DIASTOLIC BLOOD PRESSURE: 80 MMHG | SYSTOLIC BLOOD PRESSURE: 116 MMHG

## 2021-09-27 DIAGNOSIS — E78.2 MIXED HYPERLIPIDEMIA: ICD-10-CM

## 2021-09-27 DIAGNOSIS — Z00.00 ROUTINE GENERAL MEDICAL EXAMINATION AT A HEALTH CARE FACILITY: ICD-10-CM

## 2021-09-27 DIAGNOSIS — Z12.4 SCREENING FOR MALIGNANT NEOPLASM OF THE CERVIX: ICD-10-CM

## 2021-09-27 DIAGNOSIS — Z12.31 BREAST CANCER SCREENING BY MAMMOGRAM: Primary | ICD-10-CM

## 2021-09-27 LAB
BILIRUB BLD-MCNC: NEGATIVE MG/DL
CLARITY, POC: CLEAR
COLOR UR: YELLOW
GLUCOSE UR STRIP-MCNC: NEGATIVE MG/DL
KETONES UR QL: NEGATIVE
LEUKOCYTE EST, POC: NEGATIVE
NITRITE UR-MCNC: NEGATIVE MG/ML
PH UR: 7.5 [PH] (ref 5–8)
PROT UR STRIP-MCNC: NEGATIVE MG/DL
RBC # UR STRIP: ABNORMAL /UL
SP GR UR: 1.01 (ref 1–1.03)
UROBILINOGEN UR QL: NORMAL

## 2021-09-27 PROCEDURE — 99396 PREV VISIT EST AGE 40-64: CPT | Performed by: FAMILY MEDICINE

## 2021-09-27 PROCEDURE — 81003 URINALYSIS AUTO W/O SCOPE: CPT | Performed by: FAMILY MEDICINE

## 2021-09-27 RX ORDER — INFLUENZA A VIRUS A/MICHIGAN/45/2015 (H1N1) RECOMBINANT HEMAGGLUTININ ANTIGEN, INFLUENZA A VIRUS A/SINGAPORE/INFIMH-16-0019/2016 (H3N2) RECOMBINANT HEMAGGLUTININ ANTIGEN, INFLUENZA B VIRUS B/MARYLAND/15/2016 RECOMBINANT HEMAGGLUTININ ANTIGEN, AND INFLUENZA B VIRUS B/PHUKET/3073/2013 RECOMBINANT HEMAGGLUTININ ANTIGEN 45; 45; 45; 45 UG/.5ML; UG/.5ML; UG/.5ML; UG/.5ML
INJECTION INTRAMUSCULAR
COMMUNITY
End: 2021-09-27

## 2021-09-27 RX ORDER — ZOSTER VACCINE RECOMBINANT, ADJUVANTED 50 MCG/0.5
KIT INTRAMUSCULAR
COMMUNITY
End: 2021-09-27

## 2021-09-27 RX ORDER — ESTRADIOL 10 UG/1
TABLET VAGINAL
COMMUNITY
Start: 2021-09-13 | End: 2022-11-08 | Stop reason: SDUPTHER

## 2021-09-27 NOTE — PATIENT INSTRUCTIONS
Mediterranean Diet  A Mediterranean diet refers to food and lifestyle choices that are based on the traditions of countries located on the [place]. This way of eating has been shown to help prevent certain conditions and improve outcomes for people who have chronic diseases, like kidney disease and heart disease.  What are tips for following this plan?  Lifestyle  · Cook and eat meals together with your family, when possible.  · Drink enough fluid to keep your urine clear or pale yellow.  · Be physically active every day. This includes:  ? Aerobic exercise like running or swimming.  ? Leisure activities like gardening, walking, or housework.  · Get 7-8 hours of sleep each night.  · If recommended by your health care provider, drink red wine in moderation. This means 1 glass a day for nonpregnant women and 2 glasses a day for men. A glass of wine equals 5 oz (150 mL).  Reading food labels    · Check the serving size of packaged foods. For foods such as rice and pasta, the serving size refers to the amount of cooked product, not dry.  · Check the total fat in packaged foods. Avoid foods that have saturated fat or trans fats.  · Check the ingredients list for added sugars, such as corn syrup.    Shopping  · At the grocery store, buy most of your food from the areas near the walls of the store. This includes:  ? Fresh fruits and vegetables (produce).  ? Grains, beans, nuts, and seeds. Some of these may be available in unpackaged forms or large amounts (in bulk).  ? Fresh seafood.  ? Poultry and eggs.  ? Low-fat dairy products.  · Buy whole ingredients instead of prepackaged foods.  · Buy fresh fruits and vegetables in-season from local farmers markets.  · Buy frozen fruits and vegetables in resealable bags.  · If you do not have access to quality fresh seafood, buy precooked frozen shrimp or canned fish, such as tuna, salmon, or sardines.  · Buy small amounts of raw or cooked vegetables, salads, or olives from  the deli or salad bar at your store.  · Stock your pantry so you always have certain foods on hand, such as olive oil, canned tuna, canned tomatoes, rice, pasta, and beans.  Cooking  · Cook foods with extra-virgin olive oil instead of using butter or other vegetable oils.  · Have meat as a side dish, and have vegetables or grains as your main dish. This means having meat in small portions or adding small amounts of meat to foods like pasta or stew.  · Use beans or vegetables instead of meat in common dishes like chili or lasagna.  · [place] with different cooking methods. Try roasting or broiling vegetables instead of steaming or sautéeing them.  · Add frozen vegetables to soups, stews, pasta, or rice.  · Add nuts or seeds for added healthy fat at each meal. You can add these to yogurt, salads, or vegetable dishes.  · Marinate fish or vegetables using olive oil, lemon juice, garlic, and fresh herbs.  Meal planning    · Plan to eat 1 vegetarian meal one day each week. Try to work up to 2 vegetarian meals, if possible.  · Eat seafood 2 or more times a week.  · Have healthy snacks readily available, such as:  ? Vegetable sticks with hummus.  ? Greek yogurt.  ? Fruit and nut trail mix.  · Eat balanced meals throughout the week. This includes:  ? Fruit: 2-3 servings a day  ? Vegetables: 4-5 servings a day  ? Low-fat dairy: 2 servings a day  ? Fish, poultry, or lean meat: 1 serving a day  ? Beans and legumes: 2 or more servings a week  ? Nuts and seeds: 1-2 servings a day  ? Whole grains: 6-8 servings a day  ? Extra-virgin olive oil: 3-4 servings a day  · Limit red meat and sweets to only a few servings a month    What are my food choices?  · Mediterranean diet  ? Recommended  § Grains: Whole-grain pasta. Brown rice. Bulgar wheat. Polenta. Couscous. Whole-wheat bread. Oatmeal. Quinoa.  § Vegetables: Artichokes. Beets. Broccoli. Cabbage. Carrots. Eggplant. Green beans. Chard. Kale. Spinach. Onions. Leeks. Peas. Squash.  Tomatoes. Peppers. Radishes.  § Fruits: Apples. Apricots. Avocado. Berries. Bananas. Cherries. Dates. Figs. Grapes. [place]. Melon. Oranges. Peaches. Plums. Pomegranate.  § Meats and other protein foods: Beans. Almonds. Sunflower seeds. Pine nuts. Peanuts. Cod. [place]. Scallops. Shrimp. Tuna. Tilapia. Clams. Oysters. Eggs.  § Dairy: Low-fat milk. Cheese. Greek yogurt.  § Beverages: Water. Red wine. Herbal tea.  § Fats and oils: Extra virgin olive oil. Avocado oil. Grape seed oil.  § Sweets and desserts: Greek yogurt with honey. Baked apples. Poached pears. Trail mix.  § Seasoning and other foods: Basil. Cilantro. Coriander. Cumin. Mint. Parsley. Joni. Rosemary. Tarragon. Garlic. Oregano. Thyme. Pepper. Balsalmic vinegar. Tahini. Hummus. Tomato sauce. Olives. Mushrooms.  ? Limit these  § Grains: Prepackaged pasta or rice dishes. Prepackaged cereal with added sugar.  § Vegetables: Deep fried potatoes (french fries).  § Fruits: Fruit canned in syrup.  § Meats and other protein foods: Beef. Pork. Lamb. Poultry with skin. Hot dogs. [place].  § Dairy: Ice cream. Sour cream. Whole milk.  § Beverages: Juice. Sugar-sweetened soft drinks. Beer. Liquor and spirits.  § Fats and oils: Butter. Canola oil. Vegetable oil. Beef fat (tallow). Lard.  § Sweets and desserts: Cookies. Cakes. Pies. Candy.  § Seasoning and other foods: Mayonnaise. Premade sauces and marinades.  The items listed may not be a complete list. Talk with your dietitian about what dietary choices are right for you.  Summary  · The Mediterranean diet includes both food and lifestyle choices.  · Eat a variety of fresh fruits and vegetables, beans, nuts, seeds, and whole grains.  · Limit the amount of red meat and sweets that you eat.  · Talk with your health care provider about whether it is safe for you to drink red wine in moderation. This means 1 glass a day for nonpregnant women and 2 glasses a day for men. A glass of wine equals 5 oz (150 mL).  This information  is not intended to replace advice given to you by your health care provider. Make sure you discuss any questions you have with your health care provider.  Document Revised: 08/17/2017 Document Reviewed: 08/10/2017  [employer] Patient Education © 2020 [employer]  Exercising to Stay Healthy  To become healthy and stay healthy, it is recommended that you do moderate-intensity and vigorous-intensity exercise. You can tell that you are exercising at a moderate intensity if your heart starts beating faster and you start breathing faster but can still hold a conversation. You can tell that you are exercising at a vigorous intensity if you are breathing much harder and faster and cannot hold a conversation while exercising.  Exercising regularly is important. It has many health benefits, such as:  · Improving overall fitness, flexibility, and endurance.  · Increasing bone density.  · Helping with weight control.  · Decreasing body fat.  · Increasing muscle strength.  · Reducing stress and tension.  · Improving overall health.  How often should I exercise?  Choose an activity that you enjoy, and set realistic goals. Your health care provider can help you make an activity plan that works for you.  Exercise regularly as told by your health care provider. This may include:  · Doing strength training two times a week, such as:  ? Lifting weights.  ? Using resistance bands.  ? Push-ups.  ? Sit-ups.  ? Yoga.  · Doing a certain intensity of exercise for a given amount of time. Choose from these options:  ? A total of 150 minutes of moderate-intensity exercise every week.  ? A total of 75 minutes of vigorous-intensity exercise every week.  ? A mix of moderate-intensity and vigorous-intensity exercise every week.  Children, pregnant women, people who have not exercised regularly, people who are overweight, and older adults may need to talk with a health care provider about what activities are safe to do. If you have a medical  condition, be sure to talk with your health care provider before you start a new exercise program.  What are some exercise ideas?  Moderate-intensity exercise ideas include:  · Walking 1 mile (1.6 km) in about 15 minutes.  · Biking.  · Hiking.  · Golfing.  · Dancing.  · Water aerobics.  Vigorous-intensity exercise ideas include:  · Walking 4.5 miles (7.2 km) or more in about 1 hour.  · Jogging or running 5 miles (8 km) in about 1 hour.  · Biking 10 miles (16.1 km) or more in about 1 hour.  · Lap swimming.  · Roller-skating or in-line skating.  · Cross-country skiing.  · Vigorous competitive sports, such as football, basketball, and soccer.  · Jumping rope.  · Aerobic dancing.  What are some everyday activities that can help me to get exercise?  · Yard work, such as:  ? Pushing a .  ? Raking and bagging leaves.  · Washing your car.  · Pushing a stroller.  · Shoveling snow.  · Gardening.  · Washing windows or floors.  How can I be more active in my day-to-day activities?  · Use stairs instead of an elevator.  · Take a walk during your lunch break.  · If you drive, park your car farther away from your work or school.  · If you take public transportation, get off one stop early and walk the rest of the way.  · Stand up or walk around during all of your indoor phone calls.  · Get up, stretch, and walk around every 30 minutes throughout the day.  · Enjoy exercise with a friend. Support to continue exercising will help you keep a regular routine of activity.  What guidelines can I follow while exercising?  · Before you start a new exercise program, talk with your health care provider.  · Do not exercise so much that you hurt yourself, feel dizzy, or get very short of breath.  · Wear comfortable clothes and wear shoes with good support.  · Drink plenty of water while you exercise to prevent dehydration or heat stroke.  · Work out until your breathing and your heartbeat get faster.  Where to find more  information  · [place] Department of Health and Human Services: www.hhs.gov  · Centers for Disease Control and Prevention (CDC): www.cdc.gov  Summary  · Exercising regularly is important. It will improve your overall fitness, flexibility, and endurance.  · Regular exercise also will improve your overall health. It can help you control your weight, reduce stress, and improve your bone density.  · Do not exercise so much that you hurt yourself, feel dizzy, or get very short of breath.  · Before you start a new exercise program, talk with your health care provider.  This information is not intended to replace advice given to you by your health care provider. Make sure you discuss any questions you have with your health care provider.  Document Revised: 11/30/2018 Document Reviewed: 11/08/2018  [employer] Patient Education © 2021 [employer]

## 2021-09-27 NOTE — PROGRESS NOTES
Subjective   Rylie Salazar is a 51 y.o. female.     He is 1-year-old female with annual physical exam       The following portions of the patient's history were reviewed and updated as appropriate: allergies, current medications, past family history, past medical history, past social history, past surgical history and problem list.    Review of Systems   Respiratory: Negative for shortness of breath.    Cardiovascular: Negative for chest pain and leg swelling.   Gastrointestinal:        Colonoscopy at age 49-done   Genitourinary: Negative for difficulty urinating.        Menopausal   All other systems reviewed and are negative.      Objective   Physical Exam  Vitals and nursing note reviewed.   Constitutional:       Appearance: She is obese.   HENT:      Right Ear: Tympanic membrane and ear canal normal.      Left Ear: Tympanic membrane and ear canal normal.   Eyes:      Extraocular Movements: Extraocular movements intact.      Pupils: Pupils are equal, round, and reactive to light.   Neck:      Vascular: No carotid bruit.   Cardiovascular:      Rate and Rhythm: Normal rate and regular rhythm.      Pulses: Normal pulses.      Heart sounds: Normal heart sounds.   Pulmonary:      Effort: Pulmonary effort is normal.      Breath sounds: Normal breath sounds.      Comments: Breast no masses noted  Abdominal:      General: Abdomen is flat. There is no distension.      Palpations: Abdomen is soft. There is no mass.      Tenderness: There is no abdominal tenderness.   Genitourinary:     General: Normal vulva.      Comments: Vaginal vault well-healed no adnexal masses noted-cervix not present-surgical past JOCELYN  Musculoskeletal:      Right lower leg: No edema.      Left lower leg: No edema.   Lymphadenopathy:      Cervical: No cervical adenopathy.   Skin:     General: Skin is warm and dry.      Capillary Refill: Capillary refill takes less than 2 seconds.   Neurological:      General: No focal deficit present.      Mental  Status: She is alert and oriented to person, place, and time.   Psychiatric:         Mood and Affect: Mood normal.         Behavior: Behavior normal.         Thought Content: Thought content normal.         Assessment/Plan   Problems Addressed this Visit     None      Visit Diagnoses     Breast cancer screening by mammogram    -  Primary    Screening for malignant neoplasm of the cervix        Routine general medical examination at a health care facility        Relevant Orders    POC Urinalysis Dipstick, Multipro (Completed)    Mixed hyperlipidemia        Relevant Orders    Comprehensive Metabolic Panel    Lipid Panel      Diagnoses       Codes Comments    Breast cancer screening by mammogram    -  Primary ICD-10-CM: Z12.31  ICD-9-CM: V76.12     Screening for malignant neoplasm of the cervix     ICD-10-CM: Z12.4  ICD-9-CM: V76.2     Routine general medical examination at a health care facility     ICD-10-CM: Z00.00  ICD-9-CM: V70.0     Mixed hyperlipidemia     ICD-10-CM: E78.2  ICD-9-CM: 272.2           Plan above-Materna booster advised when available-flu shot done 6 months fasting cholesterol follow-up

## 2021-10-12 ENCOUNTER — CLINICAL SUPPORT (OUTPATIENT)
Dept: FAMILY MEDICINE CLINIC | Facility: CLINIC | Age: 51
End: 2021-10-12

## 2021-10-12 ENCOUNTER — TELEMEDICINE (OUTPATIENT)
Dept: FAMILY MEDICINE CLINIC | Facility: CLINIC | Age: 51
End: 2021-10-12

## 2021-10-12 ENCOUNTER — TELEPHONE (OUTPATIENT)
Dept: FAMILY MEDICINE CLINIC | Facility: CLINIC | Age: 51
End: 2021-10-12

## 2021-10-12 DIAGNOSIS — R11.0 NAUSEA: ICD-10-CM

## 2021-10-12 DIAGNOSIS — R09.89 CHEST CONGESTION: Primary | ICD-10-CM

## 2021-10-12 PROCEDURE — 99441 PR PHYS/QHP TELEPHONE EVALUATION 5-10 MIN: CPT | Performed by: NURSE PRACTITIONER

## 2021-10-12 RX ORDER — AZITHROMYCIN 250 MG/1
TABLET, FILM COATED ORAL
Qty: 6 TABLET | Refills: 0 | Status: SHIPPED | OUTPATIENT
Start: 2021-10-12 | End: 2022-02-21

## 2021-10-12 RX ORDER — ONDANSETRON 4 MG/1
4 TABLET, FILM COATED ORAL EVERY 8 HOURS PRN
Qty: 15 TABLET | Refills: 0 | Status: SHIPPED | OUTPATIENT
Start: 2021-10-12

## 2021-10-12 RX ORDER — PREDNISONE 10 MG/1
TABLET ORAL
Qty: 21 TABLET | Refills: 0 | Status: SHIPPED | OUTPATIENT
Start: 2021-10-12 | End: 2022-02-21

## 2021-10-12 NOTE — TELEPHONE ENCOUNTER
Here for covid test...Needs work statement and mention pt had appt and covid test and quarantine restrictions.

## 2021-10-12 NOTE — PROGRESS NOTES
Patient had telehealth appt.  Covid test was ordered.  Patient called upon arrival in parking lot.  Patient remained in vehicle to be swabbed.  I wore N95, personal glasses, faceshield, gown and gloves.
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
## 2021-10-13 LAB
LABCORP SARS-COV-2, NAA 2 DAY TAT: NORMAL
SARS-COV-2 RNA RESP QL NAA+PROBE: NOT DETECTED

## 2022-02-21 ENCOUNTER — OFFICE VISIT (OUTPATIENT)
Dept: FAMILY MEDICINE CLINIC | Facility: CLINIC | Age: 52
End: 2022-02-21

## 2022-02-21 VITALS
HEIGHT: 68 IN | OXYGEN SATURATION: 98 % | RESPIRATION RATE: 18 BRPM | BODY MASS INDEX: 33.74 KG/M2 | WEIGHT: 222.6 LBS | HEART RATE: 111 BPM | TEMPERATURE: 98 F | DIASTOLIC BLOOD PRESSURE: 80 MMHG | SYSTOLIC BLOOD PRESSURE: 122 MMHG

## 2022-02-21 DIAGNOSIS — E66.9 CLASS 1 OBESITY WITH BODY MASS INDEX (BMI) OF 33.0 TO 33.9 IN ADULT, UNSPECIFIED OBESITY TYPE, UNSPECIFIED WHETHER SERIOUS COMORBIDITY PRESENT: ICD-10-CM

## 2022-02-21 DIAGNOSIS — E78.2 MIXED HYPERLIPIDEMIA: Primary | ICD-10-CM

## 2022-02-21 PROCEDURE — 99213 OFFICE O/P EST LOW 20 MIN: CPT | Performed by: FAMILY MEDICINE

## 2022-02-21 NOTE — PATIENT INSTRUCTIONS
Mediterranean Diet  A Mediterranean diet refers to food and lifestyle choices that are based on the traditions of countries located on the [place]. This way of eating has been shown to help prevent certain conditions and improve outcomes for people who have chronic diseases, like kidney disease and heart disease.  What are tips for following this plan?  Lifestyle  · Cook and eat meals together with your family, when possible.  · Drink enough fluid to keep your urine clear or pale yellow.  · Be physically active every day. This includes:  ? Aerobic exercise like running or swimming.  ? Leisure activities like gardening, walking, or housework.  · Get 7-8 hours of sleep each night.  · If recommended by your health care provider, drink red wine in moderation. This means 1 glass a day for nonpregnant women and 2 glasses a day for men. A glass of wine equals 5 oz (150 mL).  Reading food labels    · Check the serving size of packaged foods. For foods such as rice and pasta, the serving size refers to the amount of cooked product, not dry.  · Check the total fat in packaged foods. Avoid foods that have saturated fat or trans fats.  · Check the ingredients list for added sugars, such as corn syrup.    Shopping  · At the grocery store, buy most of your food from the areas near the walls of the store. This includes:  ? Fresh fruits and vegetables (produce).  ? Grains, beans, nuts, and seeds. Some of these may be available in unpackaged forms or large amounts (in bulk).  ? Fresh seafood.  ? Poultry and eggs.  ? Low-fat dairy products.  · Buy whole ingredients instead of prepackaged foods.  · Buy fresh fruits and vegetables in-season from local farmers markets.  · Buy frozen fruits and vegetables in resealable bags.  · If you do not have access to quality fresh seafood, buy precooked frozen shrimp or canned fish, such as tuna, salmon, or sardines.  · Buy small amounts of raw or cooked vegetables, salads, or olives from  the deli or salad bar at your store.  · Stock your pantry so you always have certain foods on hand, such as olive oil, canned tuna, canned tomatoes, rice, pasta, and beans.  Cooking  · Cook foods with extra-virgin olive oil instead of using butter or other vegetable oils.  · Have meat as a side dish, and have vegetables or grains as your main dish. This means having meat in small portions or adding small amounts of meat to foods like pasta or stew.  · Use beans or vegetables instead of meat in common dishes like chili or lasagna.  · [place] with different cooking methods. Try roasting or broiling vegetables instead of steaming or sautéeing them.  · Add frozen vegetables to soups, stews, pasta, or rice.  · Add nuts or seeds for added healthy fat at each meal. You can add these to yogurt, salads, or vegetable dishes.  · Marinate fish or vegetables using olive oil, lemon juice, garlic, and fresh herbs.  Meal planning    · Plan to eat 1 vegetarian meal one day each week. Try to work up to 2 vegetarian meals, if possible.  · Eat seafood 2 or more times a week.  · Have healthy snacks readily available, such as:  ? Vegetable sticks with hummus.  ? Greek yogurt.  ? Fruit and nut trail mix.  · Eat balanced meals throughout the week. This includes:  ? Fruit: 2-3 servings a day  ? Vegetables: 4-5 servings a day  ? Low-fat dairy: 2 servings a day  ? Fish, poultry, or lean meat: 1 serving a day  ? Beans and legumes: 2 or more servings a week  ? Nuts and seeds: 1-2 servings a day  ? Whole grains: 6-8 servings a day  ? Extra-virgin olive oil: 3-4 servings a day  · Limit red meat and sweets to only a few servings a month    What are my food choices?  · Mediterranean diet  ? Recommended  § Grains: Whole-grain pasta. Brown rice. Bulgar wheat. Polenta. Couscous. Whole-wheat bread. Oatmeal. Quinoa.  § Vegetables: Artichokes. Beets. Broccoli. Cabbage. Carrots. Eggplant. Green beans. Chard. Kale. Spinach. Onions. Leeks. Peas. Squash.  Tomatoes. Peppers. Radishes.  § Fruits: Apples. Apricots. Avocado. Berries. Bananas. Cherries. Dates. Figs. Grapes. [place]. Melon. Oranges. Peaches. Plums. Pomegranate.  § Meats and other protein foods: Beans. Almonds. Sunflower seeds. Pine nuts. Peanuts. Cod. [place]. Scallops. Shrimp. Tuna. Tilapia. Clams. Oysters. Eggs.  § Dairy: Low-fat milk. Cheese. Greek yogurt.  § Beverages: Water. Red wine. Herbal tea.  § Fats and oils: Extra virgin olive oil. Avocado oil. Grape seed oil.  § Sweets and desserts: Greek yogurt with honey. Baked apples. Poached pears. Trail mix.  § Seasoning and other foods: Basil. Cilantro. Coriander. Cumin. Mint. Parsley. Joni. Rosemary. Tarragon. Garlic. Oregano. Thyme. Pepper. Balsalmic vinegar. Tahini. Hummus. Tomato sauce. Olives. Mushrooms.  ? Limit these  § Grains: Prepackaged pasta or rice dishes. Prepackaged cereal with added sugar.  § Vegetables: Deep fried potatoes (french fries).  § Fruits: Fruit canned in syrup.  § Meats and other protein foods: Beef. Pork. Lamb. Poultry with skin. Hot dogs. [place].  § Dairy: Ice cream. Sour cream. Whole milk.  § Beverages: Juice. Sugar-sweetened soft drinks. Beer. Liquor and spirits.  § Fats and oils: Butter. Canola oil. Vegetable oil. Beef fat (tallow). Lard.  § Sweets and desserts: Cookies. Cakes. Pies. Candy.  § Seasoning and other foods: Mayonnaise. Premade sauces and marinades.  The items listed may not be a complete list. Talk with your dietitian about what dietary choices are right for you.  Summary  · The Mediterranean diet includes both food and lifestyle choices.  · Eat a variety of fresh fruits and vegetables, beans, nuts, seeds, and whole grains.  · Limit the amount of red meat and sweets that you eat.  · Talk with your health care provider about whether it is safe for you to drink red wine in moderation. This means 1 glass a day for nonpregnant women and 2 glasses a day for men. A glass of wine equals 5 oz (150 mL).  This information  is not intended to replace advice given to you by your health care provider. Make sure you discuss any questions you have with your health care provider.  Document Revised: 08/17/2017 Document Reviewed: 08/10/2017  Elsevier Patient Education © 2020 Elsevier Inc.
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
## 2022-02-22 LAB
ALBUMIN SERPL-MCNC: 4.2 G/DL (ref 3.5–5.2)
ALBUMIN/GLOB SERPL: 1.6 G/DL
ALP SERPL-CCNC: 90 U/L (ref 39–117)
ALT SERPL-CCNC: 24 U/L (ref 1–33)
AST SERPL-CCNC: 18 U/L (ref 1–32)
BILIRUB SERPL-MCNC: 0.4 MG/DL (ref 0–1.2)
BUN SERPL-MCNC: 10 MG/DL (ref 6–20)
BUN/CREAT SERPL: 10 (ref 7–25)
CALCIUM SERPL-MCNC: 9.7 MG/DL (ref 8.6–10.5)
CHLORIDE SERPL-SCNC: 105 MMOL/L (ref 98–107)
CHOLEST SERPL-MCNC: 220 MG/DL (ref 0–200)
CO2 SERPL-SCNC: 27.6 MMOL/L (ref 22–29)
CREAT SERPL-MCNC: 1 MG/DL (ref 0.57–1)
GLOBULIN SER CALC-MCNC: 2.6 GM/DL
GLUCOSE SERPL-MCNC: 80 MG/DL (ref 65–99)
HDLC SERPL-MCNC: 46 MG/DL (ref 40–60)
LDLC SERPL CALC-MCNC: 136 MG/DL (ref 0–100)
POTASSIUM SERPL-SCNC: 3.8 MMOL/L (ref 3.5–5.2)
PROT SERPL-MCNC: 6.8 G/DL (ref 6–8.5)
SODIUM SERPL-SCNC: 143 MMOL/L (ref 136–145)
TRIGL SERPL-MCNC: 212 MG/DL (ref 0–150)
VLDLC SERPL CALC-MCNC: 38 MG/DL (ref 5–40)

## 2022-02-23 ENCOUNTER — PATIENT MESSAGE (OUTPATIENT)
Dept: FAMILY MEDICINE CLINIC | Facility: CLINIC | Age: 52
End: 2022-02-23

## 2022-02-24 NOTE — TELEPHONE ENCOUNTER
From: Rylie Salazar  To: Yusef Medrano MD  Sent: 2/23/2022 5:20 PM CST  Subject: Medicine     The medicine that Dr. medrano prescribed is on back order and [facility] is not sure when they will get it in. Can you [facility]? That is our online prescription program. Or dr medrano said he would prescribe something different if that was not available. Thanks so much.

## 2022-02-25 ENCOUNTER — PRIOR AUTHORIZATION (OUTPATIENT)
Dept: FAMILY MEDICINE CLINIC | Facility: CLINIC | Age: 52
End: 2022-02-25

## 2022-03-04 RX ORDER — PHENTERMINE HYDROCHLORIDE 37.5 MG/1
37.5 CAPSULE ORAL EVERY MORNING
Qty: 30 CAPSULE | Refills: 2 | Status: SHIPPED | OUTPATIENT
Start: 2022-03-04 | End: 2022-03-29 | Stop reason: SDUPTHER

## 2022-03-29 ENCOUNTER — OFFICE VISIT (OUTPATIENT)
Dept: FAMILY MEDICINE CLINIC | Facility: CLINIC | Age: 52
End: 2022-03-29

## 2022-03-29 VITALS
DIASTOLIC BLOOD PRESSURE: 84 MMHG | OXYGEN SATURATION: 98 % | WEIGHT: 219.8 LBS | BODY MASS INDEX: 33.31 KG/M2 | HEIGHT: 68 IN | HEART RATE: 89 BPM | SYSTOLIC BLOOD PRESSURE: 125 MMHG | TEMPERATURE: 97.8 F

## 2022-03-29 DIAGNOSIS — E66.01 MORBID (SEVERE) OBESITY DUE TO EXCESS CALORIES: Primary | ICD-10-CM

## 2022-03-29 PROCEDURE — 99213 OFFICE O/P EST LOW 20 MIN: CPT | Performed by: FAMILY MEDICINE

## 2022-03-29 RX ORDER — PHENTERMINE HYDROCHLORIDE 37.5 MG/1
37.5 CAPSULE ORAL EVERY MORNING
Qty: 60 CAPSULE | Refills: 2 | Status: SHIPPED | OUTPATIENT
Start: 2022-03-29 | End: 2022-06-08 | Stop reason: SINTOL

## 2022-04-15 ENCOUNTER — OFFICE VISIT (OUTPATIENT)
Dept: FAMILY MEDICINE CLINIC | Facility: CLINIC | Age: 52
End: 2022-04-15

## 2022-04-15 VITALS
RESPIRATION RATE: 18 BRPM | HEART RATE: 100 BPM | DIASTOLIC BLOOD PRESSURE: 78 MMHG | TEMPERATURE: 98.7 F | HEIGHT: 68 IN | OXYGEN SATURATION: 97 % | SYSTOLIC BLOOD PRESSURE: 118 MMHG | BODY MASS INDEX: 33.04 KG/M2 | WEIGHT: 218 LBS

## 2022-04-15 DIAGNOSIS — J01.00 ACUTE NON-RECURRENT MAXILLARY SINUSITIS: Primary | ICD-10-CM

## 2022-04-15 PROCEDURE — 99213 OFFICE O/P EST LOW 20 MIN: CPT | Performed by: FAMILY MEDICINE

## 2022-04-15 PROCEDURE — 96372 THER/PROPH/DIAG INJ SC/IM: CPT | Performed by: FAMILY MEDICINE

## 2022-04-15 RX ORDER — TRIAMCINOLONE ACETONIDE 40 MG/ML
40 INJECTION, SUSPENSION INTRA-ARTICULAR; INTRAMUSCULAR ONCE
Status: COMPLETED | OUTPATIENT
Start: 2022-04-15 | End: 2022-04-15

## 2022-04-15 RX ORDER — PREDNISONE 10 MG/1
TABLET ORAL
Qty: 21 TABLET | Refills: 0 | Status: SHIPPED | OUTPATIENT
Start: 2022-04-15 | End: 2022-06-08

## 2022-04-15 RX ADMIN — TRIAMCINOLONE ACETONIDE 40 MG: 40 INJECTION, SUSPENSION INTRA-ARTICULAR; INTRAMUSCULAR at 13:56

## 2022-04-15 NOTE — PROGRESS NOTES
Subjective   Rylie Salazar is a 52 y.o. female.     52-year-old female with a pressure in her left ear and history of sinusitis      The following portions of the patient's history were reviewed and updated as appropriate: allergies, current medications, past family history, past medical history, past social history, past surgical history and problem list.    Review of Systems   HENT: Positive for ear pain, sinus pressure and sinus pain.    Respiratory: Negative for cough and shortness of breath.    Cardiovascular: Negative for chest pain.       Objective   Physical Exam  Vitals and nursing note reviewed.   Constitutional:       Appearance: She is obese.   HENT:      Right Ear: Tympanic membrane and ear canal normal.      Left Ear: Ear canal normal.      Ears:      Comments: Serous otitis on the left  Cardiovascular:      Rate and Rhythm: Normal rate and regular rhythm.   Pulmonary:      Effort: Pulmonary effort is normal.      Breath sounds: Normal breath sounds.   Musculoskeletal:      Right lower leg: No edema.      Left lower leg: No edema.   Skin:     General: Skin is warm and dry.   Neurological:      General: No focal deficit present.      Mental Status: She is alert and oriented to person, place, and time.   Psychiatric:         Mood and Affect: Mood normal.         Behavior: Behavior normal.         Thought Content: Thought content normal.         Assessment/Plan   Diagnoses and all orders for this visit:    1. Acute non-recurrent maxillary sinusitis (Primary)  -     triamcinolone acetonide (KENALOG-40) injection 40 mg    Other orders  -     predniSONE (DELTASONE) 10 MG (21) dose pack; Use as directed on package start Saturday morning  Dispense: 21 tablet; Refill: 0         Plan above

## 2022-06-08 ENCOUNTER — OFFICE VISIT (OUTPATIENT)
Dept: FAMILY MEDICINE CLINIC | Facility: CLINIC | Age: 52
End: 2022-06-08

## 2022-06-08 ENCOUNTER — TELEPHONE (OUTPATIENT)
Dept: FAMILY MEDICINE CLINIC | Facility: CLINIC | Age: 52
End: 2022-06-08

## 2022-06-08 VITALS
OXYGEN SATURATION: 99 % | DIASTOLIC BLOOD PRESSURE: 76 MMHG | HEIGHT: 68 IN | HEART RATE: 86 BPM | TEMPERATURE: 97.3 F | SYSTOLIC BLOOD PRESSURE: 120 MMHG | BODY MASS INDEX: 33.37 KG/M2 | WEIGHT: 220.2 LBS

## 2022-06-08 DIAGNOSIS — E66.01 MORBID (SEVERE) OBESITY DUE TO EXCESS CALORIES: Primary | ICD-10-CM

## 2022-06-08 PROCEDURE — 99212 OFFICE O/P EST SF 10 MIN: CPT | Performed by: FAMILY MEDICINE

## 2022-06-08 NOTE — PROGRESS NOTES
Subjective   Rylie Salazar is a 52 y.o. female.     Patient 52-year-old female morbid obesity-has failed Kentera pain treatment Mediterranean diet and exercise program-wegovy recommended      The following portions of the patient's history were reviewed and updated as appropriate: allergies, current medications, past family history, past medical history, past social history, past surgical history and problem list.    Review of Systems   Cardiovascular:        Hyperlipidemia   Musculoskeletal: Positive for arthralgias.        Plantar fasciitis       Objective   Physical Exam  Vitals and nursing note reviewed.   Constitutional:       Appearance: She is obese.   Eyes:      Extraocular Movements: Extraocular movements intact.      Pupils: Pupils are equal, round, and reactive to light.   Pulmonary:      Effort: Pulmonary effort is normal.   Musculoskeletal:      Right lower leg: No edema.      Left lower leg: No edema.   Neurological:      General: No focal deficit present.      Mental Status: She is alert and oriented to person, place, and time.   Psychiatric:         Mood and Affect: Mood normal.         Behavior: Behavior normal.         Thought Content: Thought content normal.         Judgment: Judgment normal.         Assessment & Plan   Diagnoses and all orders for this visit:    1. Morbid (severe) obesity due to excess calories (HCC) (Primary)         Plan-Wegovy recommended for weight reduction along with exercise Mediterranean diet etc.

## 2022-06-08 NOTE — TELEPHONE ENCOUNTER
Caller: Rylie Salazar    Relationship: Self     What orders are you requesting (i.e. lab or imaging): NEEDING PA ON WEGOVY

## 2022-09-26 ENCOUNTER — APPOINTMENT (OUTPATIENT)
Dept: CT IMAGING | Facility: HOSPITAL | Age: 52
End: 2022-09-26

## 2022-09-26 ENCOUNTER — TRANSITIONAL CARE MANAGEMENT TELEPHONE ENCOUNTER (OUTPATIENT)
Dept: CALL CENTER | Facility: HOSPITAL | Age: 52
End: 2022-09-26

## 2022-09-26 ENCOUNTER — READMISSION MANAGEMENT (OUTPATIENT)
Dept: CALL CENTER | Facility: HOSPITAL | Age: 52
End: 2022-09-26

## 2022-09-26 ENCOUNTER — HOSPITAL ENCOUNTER (EMERGENCY)
Facility: HOSPITAL | Age: 52
Discharge: LEFT BEFORE DISCHARGE | End: 2022-09-27
Attending: INTERNAL MEDICINE | Admitting: INTERNAL MEDICINE

## 2022-09-26 ENCOUNTER — OFFICE VISIT (OUTPATIENT)
Dept: FAMILY MEDICINE CLINIC | Facility: CLINIC | Age: 52
End: 2022-09-26

## 2022-09-26 ENCOUNTER — TELEPHONE (OUTPATIENT)
Dept: FAMILY MEDICINE CLINIC | Facility: CLINIC | Age: 52
End: 2022-09-26

## 2022-09-26 VITALS
DIASTOLIC BLOOD PRESSURE: 78 MMHG | OXYGEN SATURATION: 98 % | SYSTOLIC BLOOD PRESSURE: 122 MMHG | TEMPERATURE: 98.4 F | HEIGHT: 68 IN | HEART RATE: 77 BPM | RESPIRATION RATE: 18 BRPM | BODY MASS INDEX: 33.83 KG/M2 | WEIGHT: 223.2 LBS

## 2022-09-26 DIAGNOSIS — R55 SYNCOPE, UNSPECIFIED SYNCOPE TYPE: ICD-10-CM

## 2022-09-26 DIAGNOSIS — E87.6 HYPOKALEMIA: ICD-10-CM

## 2022-09-26 DIAGNOSIS — G45.9 TIA (TRANSIENT ISCHEMIC ATTACK): Primary | ICD-10-CM

## 2022-09-26 DIAGNOSIS — R20.0 FACIAL NUMBNESS: Primary | ICD-10-CM

## 2022-09-26 PROBLEM — E66.9 OBESITY (BMI 30-39.9): Status: ACTIVE | Noted: 2022-09-26

## 2022-09-26 PROBLEM — R20.2 NUMBNESS AND TINGLING: Status: ACTIVE | Noted: 2022-09-26

## 2022-09-26 LAB
ALBUMIN SERPL-MCNC: 4.5 G/DL (ref 3.5–5.2)
ALBUMIN/GLOB SERPL: 1.8 G/DL
ALP SERPL-CCNC: 81 U/L (ref 39–117)
ALT SERPL W P-5'-P-CCNC: 13 U/L (ref 1–33)
AMPHET+METHAMPHET UR QL: NEGATIVE
AMPHETAMINES UR QL: NEGATIVE
ANION GAP SERPL CALCULATED.3IONS-SCNC: 9 MMOL/L (ref 5–15)
AST SERPL-CCNC: 14 U/L (ref 1–32)
BARBITURATES UR QL SCN: NEGATIVE
BASOPHILS # BLD AUTO: 0.07 10*3/MM3 (ref 0–0.2)
BASOPHILS NFR BLD AUTO: 0.9 % (ref 0–1.5)
BENZODIAZ UR QL SCN: NEGATIVE
BILIRUB SERPL-MCNC: 0.4 MG/DL (ref 0–1.2)
BILIRUB UR QL STRIP: NEGATIVE
BUN SERPL-MCNC: 10 MG/DL (ref 6–20)
BUN/CREAT SERPL: 13.3 (ref 7–25)
BUPRENORPHINE SERPL-MCNC: NEGATIVE NG/ML
CALCIUM SPEC-SCNC: 9.3 MG/DL (ref 8.6–10.5)
CANNABINOIDS SERPL QL: NEGATIVE
CHLORIDE SERPL-SCNC: 104 MMOL/L (ref 98–107)
CLARITY UR: CLEAR
CO2 SERPL-SCNC: 29 MMOL/L (ref 22–29)
COCAINE UR QL: NEGATIVE
COLOR UR: YELLOW
CREAT SERPL-MCNC: 0.75 MG/DL (ref 0.57–1)
CRP SERPL-MCNC: <0.3 MG/DL (ref 0–0.5)
D DIMER PPP FEU-MCNC: <0.27 MCGFEU/ML (ref 0–0.5)
D-LACTATE SERPL-SCNC: 0.7 MMOL/L (ref 0.5–2)
DEPRECATED RDW RBC AUTO: 40.8 FL (ref 37–54)
EGFRCR SERPLBLD CKD-EPI 2021: 95.3 ML/MIN/1.73
EOSINOPHIL # BLD AUTO: 0.11 10*3/MM3 (ref 0–0.4)
EOSINOPHIL NFR BLD AUTO: 1.4 % (ref 0.3–6.2)
ERYTHROCYTE [DISTWIDTH] IN BLOOD BY AUTOMATED COUNT: 12.6 % (ref 12.3–15.4)
GLOBULIN UR ELPH-MCNC: 2.5 GM/DL
GLUCOSE SERPL-MCNC: 100 MG/DL (ref 65–99)
GLUCOSE UR STRIP-MCNC: NEGATIVE MG/DL
HCT VFR BLD AUTO: 43.5 % (ref 34–46.6)
HGB BLD-MCNC: 14.6 G/DL (ref 12–15.9)
HGB UR QL STRIP.AUTO: NEGATIVE
HOLD SPECIMEN: NORMAL
IMM GRANULOCYTES # BLD AUTO: 0.01 10*3/MM3 (ref 0–0.05)
IMM GRANULOCYTES NFR BLD AUTO: 0.1 % (ref 0–0.5)
INR PPP: 0.98 (ref 0.91–1.09)
KETONES UR QL STRIP: NEGATIVE
LEUKOCYTE ESTERASE UR QL STRIP.AUTO: NEGATIVE
LYMPHOCYTES # BLD AUTO: 2.22 10*3/MM3 (ref 0.7–3.1)
LYMPHOCYTES NFR BLD AUTO: 28.6 % (ref 19.6–45.3)
MAGNESIUM SERPL-MCNC: 2.1 MG/DL (ref 1.6–2.6)
MCH RBC QN AUTO: 29.8 PG (ref 26.6–33)
MCHC RBC AUTO-ENTMCNC: 33.6 G/DL (ref 31.5–35.7)
MCV RBC AUTO: 88.8 FL (ref 79–97)
METHADONE UR QL SCN: NEGATIVE
MONOCYTES # BLD AUTO: 0.54 10*3/MM3 (ref 0.1–0.9)
MONOCYTES NFR BLD AUTO: 7 % (ref 5–12)
NEUTROPHILS NFR BLD AUTO: 4.8 10*3/MM3 (ref 1.7–7)
NEUTROPHILS NFR BLD AUTO: 62 % (ref 42.7–76)
NITRITE UR QL STRIP: NEGATIVE
NRBC BLD AUTO-RTO: 0 /100 WBC (ref 0–0.2)
OPIATES UR QL: NEGATIVE
OXYCODONE UR QL SCN: NEGATIVE
PCP UR QL SCN: NEGATIVE
PH UR STRIP.AUTO: 6 [PH] (ref 5–8)
PLATELET # BLD AUTO: 319 10*3/MM3 (ref 140–450)
PMV BLD AUTO: 9.6 FL (ref 6–12)
POTASSIUM SERPL-SCNC: 3.4 MMOL/L (ref 3.5–5.2)
PROPOXYPH UR QL: NEGATIVE
PROT SERPL-MCNC: 7 G/DL (ref 6–8.5)
PROT UR QL STRIP: NEGATIVE
PROTHROMBIN TIME: 12.6 SECONDS (ref 11.9–14.6)
RBC # BLD AUTO: 4.9 10*6/MM3 (ref 3.77–5.28)
SARS-COV-2 RNA PNL SPEC NAA+PROBE: NOT DETECTED
SODIUM SERPL-SCNC: 142 MMOL/L (ref 136–145)
SP GR UR STRIP: 1.01 (ref 1–1.03)
TRICYCLICS UR QL SCN: NEGATIVE
TROPONIN T SERPL-MCNC: <0.01 NG/ML (ref 0–0.03)
UROBILINOGEN UR QL STRIP: NORMAL
WBC NRBC COR # BLD: 7.75 10*3/MM3 (ref 3.4–10.8)
WHOLE BLOOD HOLD COAG: NORMAL
WHOLE BLOOD HOLD SPECIMEN: NORMAL

## 2022-09-26 PROCEDURE — 85025 COMPLETE CBC W/AUTO DIFF WBC: CPT

## 2022-09-26 PROCEDURE — 99284 EMERGENCY DEPT VISIT MOD MDM: CPT

## 2022-09-26 PROCEDURE — 87635 SARS-COV-2 COVID-19 AMP PRB: CPT | Performed by: NURSE PRACTITIONER

## 2022-09-26 PROCEDURE — 84484 ASSAY OF TROPONIN QUANT: CPT | Performed by: NURSE PRACTITIONER

## 2022-09-26 PROCEDURE — 85379 FIBRIN DEGRADATION QUANT: CPT | Performed by: NURSE PRACTITIONER

## 2022-09-26 PROCEDURE — C9803 HOPD COVID-19 SPEC COLLECT: HCPCS

## 2022-09-26 PROCEDURE — 80053 COMPREHEN METABOLIC PANEL: CPT

## 2022-09-26 PROCEDURE — 80306 DRUG TEST PRSMV INSTRMNT: CPT | Performed by: NURSE PRACTITIONER

## 2022-09-26 PROCEDURE — G0378 HOSPITAL OBSERVATION PER HR: HCPCS

## 2022-09-26 PROCEDURE — 83605 ASSAY OF LACTIC ACID: CPT | Performed by: NURSE PRACTITIONER

## 2022-09-26 PROCEDURE — 85610 PROTHROMBIN TIME: CPT | Performed by: NURSE PRACTITIONER

## 2022-09-26 PROCEDURE — 81003 URINALYSIS AUTO W/O SCOPE: CPT | Performed by: NURSE PRACTITIONER

## 2022-09-26 PROCEDURE — 83735 ASSAY OF MAGNESIUM: CPT | Performed by: INTERNAL MEDICINE

## 2022-09-26 PROCEDURE — 70450 CT HEAD/BRAIN W/O DYE: CPT

## 2022-09-26 PROCEDURE — 99213 OFFICE O/P EST LOW 20 MIN: CPT | Performed by: FAMILY MEDICINE

## 2022-09-26 PROCEDURE — 36415 COLL VENOUS BLD VENIPUNCTURE: CPT

## 2022-09-26 PROCEDURE — 86140 C-REACTIVE PROTEIN: CPT | Performed by: NURSE PRACTITIONER

## 2022-09-26 RX ORDER — SODIUM CHLORIDE 0.9 % (FLUSH) 0.9 %
10 SYRINGE (ML) INJECTION AS NEEDED
Status: DISCONTINUED | OUTPATIENT
Start: 2022-09-26 | End: 2022-09-27 | Stop reason: HOSPADM

## 2022-09-26 RX ORDER — ASPIRIN 300 MG/1
300 SUPPOSITORY RECTAL DAILY
Status: DISCONTINUED | OUTPATIENT
Start: 2022-09-27 | End: 2022-09-27 | Stop reason: HOSPADM

## 2022-09-26 RX ORDER — SODIUM CHLORIDE 0.9 % (FLUSH) 0.9 %
10 SYRINGE (ML) INJECTION EVERY 12 HOURS SCHEDULED
Status: DISCONTINUED | OUTPATIENT
Start: 2022-09-26 | End: 2022-09-27 | Stop reason: HOSPADM

## 2022-09-26 RX ORDER — ONDANSETRON 2 MG/ML
4 INJECTION INTRAMUSCULAR; INTRAVENOUS EVERY 6 HOURS PRN
Status: DISCONTINUED | OUTPATIENT
Start: 2022-09-26 | End: 2022-09-27 | Stop reason: HOSPADM

## 2022-09-26 RX ORDER — POTASSIUM CHLORIDE 750 MG/1
40 CAPSULE, EXTENDED RELEASE ORAL ONCE
Status: COMPLETED | OUTPATIENT
Start: 2022-09-26 | End: 2022-09-26

## 2022-09-26 RX ORDER — ASPIRIN 81 MG/1
81 TABLET, CHEWABLE ORAL DAILY
Status: DISCONTINUED | OUTPATIENT
Start: 2022-09-27 | End: 2022-09-27 | Stop reason: HOSPADM

## 2022-09-26 RX ORDER — ATORVASTATIN CALCIUM 40 MG/1
80 TABLET, FILM COATED ORAL NIGHTLY
Status: DISCONTINUED | OUTPATIENT
Start: 2022-09-26 | End: 2022-09-27 | Stop reason: HOSPADM

## 2022-09-26 RX ORDER — ACETAMINOPHEN 325 MG/1
650 TABLET ORAL EVERY 4 HOURS PRN
Status: DISCONTINUED | OUTPATIENT
Start: 2022-09-26 | End: 2022-09-27 | Stop reason: HOSPADM

## 2022-09-26 RX ADMIN — POTASSIUM CHLORIDE 40 MEQ: 10 CAPSULE, COATED, EXTENDED RELEASE ORAL at 22:27

## 2022-09-26 NOTE — OUTREACH NOTE
Call Center TCM Note    Flowsheet Row Responses   [facility] patient discharged from? Non-BH   Does the patient have one of the following disease processes/diagnoses(primary or secondary)? Other   TCM attempt successful? Yes   Call start time 1450   Call end time 1450   Discharge diagnosis Numbness in face, syncope   TCM call completed? Yes   Wrap up additional comments Pt had TCM follow up today with PCP          Michelle Alejandro LPN    9/26/2022, 14:51 CDT

## 2022-09-26 NOTE — TELEPHONE ENCOUNTER
Caller: LEIGH HOWE     Relationship to patient: SELF    Best call back number: [phone] (M)  New or established patient?  [] New  [x] Established    Date of discharge: 09/25/22    Facility discharged from: [facility]     Diagnosis/Symptoms: STATES NUMB IN HER FACE AND PASSED OUT     Length of stay (If applicable): DID NOT STATE     Specialty Only: Did you see a [facility] health provider?    [] Yes  [x] No  If so, who?
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
## 2022-09-26 NOTE — OUTREACH NOTE
Prep Survey    Flowsheet Row Responses   [facility] facility patient discharged from? Non-BH   Is LACE score < 7 ? Non-BH Discharge   Emergency Room discharge w/ pulse ox? No   Eligibility [facility]   Date of Discharge 09/25/22   Discharge Disposition Home or Self Care   Discharge diagnosis Numbness in face, syncope   Does the patient have one of the following disease processes/diagnoses(primary or secondary)? Other   Prep survey completed? Yes          CRYSTAL MAE - Registered Nurse

## 2022-09-27 ENCOUNTER — TELEPHONE (OUTPATIENT)
Dept: FAMILY MEDICINE CLINIC | Facility: CLINIC | Age: 52
End: 2022-09-27

## 2022-09-27 VITALS
WEIGHT: 222 LBS | HEIGHT: 68 IN | DIASTOLIC BLOOD PRESSURE: 72 MMHG | SYSTOLIC BLOOD PRESSURE: 128 MMHG | HEART RATE: 91 BPM | TEMPERATURE: 97.4 F | RESPIRATION RATE: 18 BRPM | OXYGEN SATURATION: 100 % | BODY MASS INDEX: 33.65 KG/M2

## 2022-09-27 DIAGNOSIS — G45.9 TIA (TRANSIENT ISCHEMIC ATTACK): Primary | ICD-10-CM

## 2022-09-27 NOTE — TELEPHONE ENCOUNTER
Caller: Rylie Salazar    Relationship: Self    Best call back number: [phone]    What orders are you requesting (i.e. lab or imaging): OUTPATIENT MRI OF NECK     In what timeframe would the patient need to come in: ASA    Where will you receive your lab/imaging services: [facility]    Additional notes: PATIENT STATES SHE WAS IN THE [facility] FOR 10 HOURS LAST NIGHT (9/26/22). DUE TO THIS LONG WAIT, PATIENT DID NOT RECEIVE AN MRI.
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
## 2022-09-30 ENCOUNTER — PATIENT ROUNDING (BHMG ONLY) (OUTPATIENT)
Dept: FAMILY MEDICINE CLINIC | Facility: CLINIC | Age: 52
End: 2022-09-30

## 2022-09-30 NOTE — PROGRESS NOTES
September 30, 2022    Hello, may I speak with Rylie Salazar?    My name is Mee    I am  with [facility]  [address], [address]  [facility] [address] [phone].    Before we get started may I verify your date of birth? 1970    I am calling to officially welcome you to our practice and ask about your recent visit. Is this a good time to talk? Yes    Tell me about your visit with us. What things went well?  Everything went well!       We're always looking for ways to make our patients' experiences even better. Do you have recommendations on ways we may improve?  No    Overall were you satisfied with your first visit to our practice? Yes       I appreciate you taking the time to speak with me today. Is there anything else I can do for you? No      Thank you, and have a great day.

## 2022-10-10 ENCOUNTER — OFFICE VISIT (OUTPATIENT)
Dept: FAMILY MEDICINE CLINIC | Facility: CLINIC | Age: 52
End: 2022-10-10

## 2022-10-10 VITALS
RESPIRATION RATE: 16 BRPM | DIASTOLIC BLOOD PRESSURE: 88 MMHG | WEIGHT: 226 LBS | OXYGEN SATURATION: 97 % | BODY MASS INDEX: 34.25 KG/M2 | TEMPERATURE: 98.5 F | SYSTOLIC BLOOD PRESSURE: 130 MMHG | HEIGHT: 68 IN | HEART RATE: 97 BPM

## 2022-10-10 DIAGNOSIS — Z00.00 ROUTINE GENERAL MEDICAL EXAMINATION AT A HEALTH CARE FACILITY: Primary | ICD-10-CM

## 2022-10-10 DIAGNOSIS — M54.9 BACK PAIN, UNSPECIFIED BACK LOCATION, UNSPECIFIED BACK PAIN LATERALITY, UNSPECIFIED CHRONICITY: ICD-10-CM

## 2022-10-10 DIAGNOSIS — Z12.31 SCREENING MAMMOGRAM FOR BREAST CANCER: ICD-10-CM

## 2022-10-10 DIAGNOSIS — Z23 NEED FOR INFLUENZA VACCINATION: ICD-10-CM

## 2022-10-10 DIAGNOSIS — Z78.0 MENOPAUSE: ICD-10-CM

## 2022-10-10 LAB
BILIRUB BLD-MCNC: NEGATIVE MG/DL
CLARITY, POC: CLEAR
COLOR UR: YELLOW
GLUCOSE UR STRIP-MCNC: NEGATIVE MG/DL
KETONES UR QL: NEGATIVE
LEUKOCYTE EST, POC: NEGATIVE
NITRITE UR-MCNC: NEGATIVE MG/ML
PH UR: 6 [PH] (ref 5–8)
PROT UR STRIP-MCNC: NEGATIVE MG/DL
RBC # UR STRIP: NEGATIVE /UL
SP GR UR: 1 (ref 1–1.03)
UROBILINOGEN UR QL: NORMAL

## 2022-10-10 PROCEDURE — 90686 IIV4 VACC NO PRSV 0.5 ML IM: CPT | Performed by: FAMILY MEDICINE

## 2022-10-10 PROCEDURE — 90471 IMMUNIZATION ADMIN: CPT | Performed by: FAMILY MEDICINE

## 2022-10-10 PROCEDURE — 81003 URINALYSIS AUTO W/O SCOPE: CPT | Performed by: FAMILY MEDICINE

## 2022-10-10 PROCEDURE — 99396 PREV VISIT EST AGE 40-64: CPT | Performed by: FAMILY MEDICINE

## 2022-10-10 RX ORDER — BUPROPION HYDROCHLORIDE 150 MG/1
150 TABLET, EXTENDED RELEASE ORAL EVERY MORNING
Qty: 90 TABLET | Refills: 3 | Status: SHIPPED | OUTPATIENT
Start: 2022-10-10 | End: 2022-11-08 | Stop reason: SDUPTHER
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
## 2022-10-11 LAB
CHOLEST SERPL-MCNC: 216 MG/DL (ref 0–200)
DHEA-S SERPL-MCNC: 58.5 UG/DL (ref 41.2–243.7)
FSH SERPL-ACNC: 132 MIU/ML
HDLC SERPL-MCNC: 46 MG/DL (ref 40–60)
LDLC SERPL CALC-MCNC: 130 MG/DL (ref 0–100)
LH SERPL-ACNC: 70.3 MIU/ML
T4 FREE SERPL-MCNC: 1.45 NG/DL (ref 0.93–1.7)
TRIGL SERPL-MCNC: 224 MG/DL (ref 0–150)
TSH SERPL DL<=0.005 MIU/L-ACNC: 1.46 UIU/ML (ref 0.27–4.2)
VLDLC SERPL CALC-MCNC: 40 MG/DL (ref 5–40)

## 2022-11-08 ENCOUNTER — OFFICE VISIT (OUTPATIENT)
Dept: FAMILY MEDICINE CLINIC | Facility: CLINIC | Age: 52
End: 2022-11-08

## 2022-11-08 VITALS
DIASTOLIC BLOOD PRESSURE: 78 MMHG | RESPIRATION RATE: 16 BRPM | OXYGEN SATURATION: 98 % | HEIGHT: 68 IN | SYSTOLIC BLOOD PRESSURE: 126 MMHG | BODY MASS INDEX: 34.16 KG/M2 | TEMPERATURE: 97.8 F | WEIGHT: 225.4 LBS | HEART RATE: 96 BPM

## 2022-11-08 DIAGNOSIS — G45.9 TIA (TRANSIENT ISCHEMIC ATTACK): Primary | ICD-10-CM

## 2022-11-08 PROCEDURE — 99213 OFFICE O/P EST LOW 20 MIN: CPT | Performed by: FAMILY MEDICINE

## 2022-11-08 RX ORDER — ESTRADIOL 10 UG/1
1 TABLET VAGINAL 2 TIMES WEEKLY
Qty: 24 TABLET | Refills: 3 | Status: SHIPPED | OUTPATIENT
Start: 2022-11-10 | End: 2023-02-08

## 2022-11-08 RX ORDER — ESTRADIOL 10 UG/1
1 TABLET VAGINAL 2 TIMES WEEKLY
Qty: 24 TABLET | Refills: 3 | Status: SHIPPED | OUTPATIENT
Start: 2022-11-10 | End: 2022-11-08 | Stop reason: SDUPTHER

## 2022-11-08 RX ORDER — BUPROPION HYDROCHLORIDE 150 MG/1
150 TABLET, EXTENDED RELEASE ORAL EVERY MORNING
Qty: 90 TABLET | Refills: 3 | Status: SHIPPED | OUTPATIENT
Start: 2022-11-08

## 2022-11-08 RX ORDER — HYDROCHLOROTHIAZIDE 12.5 MG/1
12.5 TABLET ORAL DAILY
COMMUNITY
Start: 2022-11-07
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
## 2023-02-14 ENCOUNTER — OFFICE VISIT (OUTPATIENT)
Dept: FAMILY MEDICINE CLINIC | Facility: CLINIC | Age: 53
End: 2023-02-14
Payer: COMMERCIAL

## 2023-02-14 VITALS
TEMPERATURE: 97.2 F | DIASTOLIC BLOOD PRESSURE: 84 MMHG | HEART RATE: 99 BPM | BODY MASS INDEX: 33.43 KG/M2 | HEIGHT: 68 IN | OXYGEN SATURATION: 98 % | WEIGHT: 220.6 LBS | SYSTOLIC BLOOD PRESSURE: 125 MMHG

## 2023-02-14 DIAGNOSIS — J01.01 RECURRENT MAXILLARY SINUSITIS: Primary | ICD-10-CM

## 2023-02-14 PROCEDURE — 99213 OFFICE O/P EST LOW 20 MIN: CPT | Performed by: NURSE PRACTITIONER

## 2023-02-14 PROCEDURE — 96372 THER/PROPH/DIAG INJ SC/IM: CPT | Performed by: NURSE PRACTITIONER

## 2023-02-14 RX ORDER — AZELASTINE 1 MG/ML
2 SPRAY, METERED NASAL 2 TIMES DAILY
Qty: 30 ML | Refills: 0 | Status: SHIPPED | OUTPATIENT
Start: 2023-02-14

## 2023-02-14 RX ORDER — CEFDINIR 300 MG/1
300 CAPSULE ORAL 2 TIMES DAILY
Qty: 14 CAPSULE | Refills: 0 | Status: SHIPPED | OUTPATIENT
Start: 2023-02-14 | End: 2023-04-04

## 2023-02-14 RX ORDER — TRIAMCINOLONE ACETONIDE 40 MG/ML
40 INJECTION, SUSPENSION INTRA-ARTICULAR; INTRAMUSCULAR ONCE
Status: COMPLETED | OUTPATIENT
Start: 2023-02-14 | End: 2023-02-14

## 2023-02-14 RX ORDER — PREDNISONE 10 MG/1
TABLET ORAL
Qty: 21 TABLET | Refills: 0 | Status: SHIPPED | OUTPATIENT
Start: 2023-02-14 | End: 2023-04-04

## 2023-02-14 RX ADMIN — TRIAMCINOLONE ACETONIDE 40 MG: 40 INJECTION, SUSPENSION INTRA-ARTICULAR; INTRAMUSCULAR at 09:48
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
## 2023-04-04 ENCOUNTER — OFFICE VISIT (OUTPATIENT)
Dept: FAMILY MEDICINE CLINIC | Facility: CLINIC | Age: 53
End: 2023-04-04
Payer: COMMERCIAL

## 2023-04-04 VITALS
WEIGHT: 215.8 LBS | DIASTOLIC BLOOD PRESSURE: 78 MMHG | TEMPERATURE: 97.5 F | HEART RATE: 89 BPM | SYSTOLIC BLOOD PRESSURE: 114 MMHG | BODY MASS INDEX: 32.71 KG/M2 | RESPIRATION RATE: 18 BRPM | OXYGEN SATURATION: 97 % | HEIGHT: 68 IN

## 2023-04-04 DIAGNOSIS — E66.01 MORBID (SEVERE) OBESITY DUE TO EXCESS CALORIES: Primary | ICD-10-CM

## 2023-04-04 DIAGNOSIS — M72.2 PLANTAR FASCIITIS OF LEFT FOOT: ICD-10-CM

## 2023-04-04 RX ORDER — SEMAGLUTIDE 0.25 MG/.5ML
INJECTION, SOLUTION SUBCUTANEOUS
COMMUNITY
Start: 2023-03-09 | End: 2023-04-04

## 2023-04-04 NOTE — PROGRESS NOTES
Subjective   Rylie Salazar is a 53 y.o. female.     History of Present Illness  53-year-old female with obesity and Planter fasciitis-starting on second month of wegovy      The following portions of the patient's history were reviewed and updated as appropriate: allergies, current medications, past family history, past medical history, past social history, past surgical history and problem list.    Review of Systems   Respiratory: Negative for shortness of breath.    Cardiovascular: Negative for chest pain and leg swelling.   Gastrointestinal:        Side effects of Wegovy discussed   Musculoskeletal:        Severe Planter fasciitis x2 years-has tried everything is probably a surgical candidate       Objective   Physical Exam  Vitals and nursing note reviewed.   Constitutional:       Appearance: She is obese.   Eyes:      Extraocular Movements: Extraocular movements intact.      Pupils: Pupils are equal, round, and reactive to light.   Cardiovascular:      Rate and Rhythm: Normal rate and regular rhythm.   Pulmonary:      Effort: Pulmonary effort is normal.      Breath sounds: Normal breath sounds.   Musculoskeletal:      Right lower leg: No edema.      Left lower leg: No edema.   Neurological:      General: No focal deficit present.      Mental Status: She is oriented to person, place, and time.   Psychiatric:         Mood and Affect: Mood normal.         Behavior: Behavior normal.         Thought Content: Thought content normal.         Judgment: Judgment normal.         Assessment & Plan   Diagnoses and all orders for this visit:    1. Morbid (severe) obesity due to excess calories (Primary)    2. Plantar fasciitis of left foot  -     Ambulatory Referral to Podiatry    Other orders  -     Semaglutide-Weight Management 0.5 MG/0.5ML solution auto-injector; Inject 0.5 mL under the skin into the appropriate area as directed 1 (One) Time Per Week.  Dispense: 2 mL; Refill: 0       Plan above-return in 3 months progress  from 0.5mg to 1 mg -1.7mg -2.4mg

## 2023-04-17 ENCOUNTER — TELEPHONE (OUTPATIENT)
Dept: FAMILY MEDICINE CLINIC | Facility: CLINIC | Age: 53
End: 2023-04-17
Payer: COMMERCIAL

## 2023-04-17 RX ORDER — SEMAGLUTIDE 1 MG/.5ML
1 INJECTION, SOLUTION SUBCUTANEOUS WEEKLY
Qty: 2 ML | Refills: 0 | Status: SHIPPED | OUTPATIENT
Start: 2023-04-17

## 2023-05-26 RX ORDER — SEMAGLUTIDE 1 MG/.5ML
0.5 INJECTION, SOLUTION SUBCUTANEOUS WEEKLY
Qty: 2 ML | Refills: 2 | Status: CANCELLED | OUTPATIENT
Start: 2023-05-26

## 2023-05-26 RX ORDER — SEMAGLUTIDE 1 MG/.5ML
1.25 INJECTION, SOLUTION SUBCUTANEOUS WEEKLY
Qty: 2 ML | Refills: 0 | Status: SHIPPED | OUTPATIENT
Start: 2023-05-26 | End: 2023-05-26

## 2023-05-26 NOTE — TELEPHONE ENCOUNTER
"Medication clarification received to send new rx--\"inject 0.5ml sq once a week.\"    Rx Refill Note  Requested Prescriptions     Pending Prescriptions Disp Refills   • Semaglutide-Weight Management (Wegovy) 1 MG/0.5ML solution auto-injector 2 mL 2     Sig: Inject 0.5 mL under the skin into the appropriate area as directed 1 (One) Time Per Week.      Last office visit with prescribing clinician: 4/4/2023   Last telemedicine visit with prescribing clinician: Visit date not found   Next office visit with prescribing clinician: 7/12/2023                         Would you like a call back once the refill request has been completed: [] Yes [] No    If the office needs to give you a call back, can they leave a voicemail: [] Yes [] No    Tano Urban Rep  05/26/23, 12:34 CDT  "

## 2023-06-06 ENCOUNTER — OFFICE VISIT (OUTPATIENT)
Dept: FAMILY MEDICINE CLINIC | Facility: CLINIC | Age: 53
End: 2023-06-06
Payer: COMMERCIAL

## 2023-06-06 VITALS
RESPIRATION RATE: 16 BRPM | SYSTOLIC BLOOD PRESSURE: 114 MMHG | WEIGHT: 202.6 LBS | BODY MASS INDEX: 30.71 KG/M2 | DIASTOLIC BLOOD PRESSURE: 70 MMHG | OXYGEN SATURATION: 98 % | HEART RATE: 83 BPM | HEIGHT: 68 IN | TEMPERATURE: 97.4 F

## 2023-06-06 DIAGNOSIS — E78.2 MIXED HYPERLIPIDEMIA: Primary | ICD-10-CM

## 2023-06-06 DIAGNOSIS — E66.01 MORBID (SEVERE) OBESITY DUE TO EXCESS CALORIES: ICD-10-CM

## 2023-06-06 NOTE — PROGRESS NOTES
Subjective   Rylie Salazar is a 53 y.o. female.     History of Present Illness  53-year-old female with obesity-making progress on Wegovy    The following portions of the patient's history were reviewed and updated as appropriate: allergies, current medications, past family history, past medical history, past social history, past surgical history, and problem list.    Review of Systems   Respiratory:  Negative for shortness of breath.    Cardiovascular:  Negative for chest pain and leg swelling.   Gastrointestinal:  Negative for nausea and vomiting.     Objective   Physical Exam  Vitals and nursing note reviewed.   Constitutional:       Appearance: She is obese.   Eyes:      Extraocular Movements: Extraocular movements intact.      Pupils: Pupils are equal, round, and reactive to light.   Cardiovascular:      Rate and Rhythm: Normal rate.   Pulmonary:      Effort: Pulmonary effort is normal.      Breath sounds: Normal breath sounds.   Abdominal:      General: Abdomen is flat.   Musculoskeletal:      Right lower leg: No edema.      Left lower leg: No edema.   Skin:     General: Skin is warm and dry.   Neurological:      General: No focal deficit present.      Mental Status: She is alert and oriented to person, place, and time.   Psychiatric:         Mood and Affect: Mood normal.         Behavior: Behavior normal.         Thought Content: Thought content normal.         Judgment: Judgment normal.       Assessment & Plan   Diagnoses and all orders for this visit:    1. Mixed hyperlipidemia (Primary)    2. Morbid (severe) obesity due to excess calories    Other orders  -     Semaglutide-Weight Management 2.4 MG/0.75ML solution auto-injector; Inject 2.4 mg under the skin into the appropriate area as directed 1 (One) Time Per Week.  Dispense: 0.75 mL; Refill: 2         Plan increase dose-patient lost over 20 pounds in the last 5 months doing well continue for life

## 2023-06-12 RX ORDER — BUPROPION HYDROCHLORIDE 150 MG/1
150 TABLET, EXTENDED RELEASE ORAL EVERY MORNING
Qty: 90 TABLET | Refills: 3 | Status: SHIPPED | OUTPATIENT
Start: 2023-06-12

## 2023-06-12 NOTE — TELEPHONE ENCOUNTER
Rx Refill Note  Requested Prescriptions     Pending Prescriptions Disp Refills    buPROPion SR (Wellbutrin SR) 150 MG 12 hr tablet 90 tablet 3     Sig: Take 1 tablet by mouth Every Morning.      Last office visit with prescribing clinician: 6/6/2023   Last telemedicine visit with prescribing clinician: Visit date not found   Next office visit with prescribing clinician: 10/31/2023   {    Ruthann Shin MA  06/12/23, 08:28 CDT

## 2023-08-15 NOTE — TELEPHONE ENCOUNTER
Rx Refill Note  Requested Prescriptions      No prescriptions requested or ordered in this encounter      Last office visit with prescribing clinician: 6/6/2023   Last telemedicine visit with prescribing clinician: Visit date not found   Next office visit with prescribing clinician: 10/31/2023                         Would you like a call back once the refill request has been completed: [] Yes [] No    If the office needs to give you a call back, can they leave a voicemail: [] Yes [] No    Tano Urban Rep  08/15/23, 09:13 CDT

## 2023-08-28 ENCOUNTER — OFFICE VISIT (OUTPATIENT)
Dept: FAMILY MEDICINE CLINIC | Facility: CLINIC | Age: 53
End: 2023-08-28
Payer: COMMERCIAL

## 2023-08-28 VITALS
RESPIRATION RATE: 18 BRPM | SYSTOLIC BLOOD PRESSURE: 138 MMHG | HEART RATE: 98 BPM | TEMPERATURE: 98.8 F | OXYGEN SATURATION: 97 % | WEIGHT: 202 LBS | DIASTOLIC BLOOD PRESSURE: 84 MMHG | HEIGHT: 68 IN | BODY MASS INDEX: 30.62 KG/M2

## 2023-08-28 DIAGNOSIS — J01.00 SUBACUTE MAXILLARY SINUSITIS: ICD-10-CM

## 2023-08-28 DIAGNOSIS — J02.9 SORE THROAT: Primary | ICD-10-CM

## 2023-08-28 LAB
EXPIRATION DATE: NORMAL
EXPIRATION DATE: NORMAL
FLUAV AG UPPER RESP QL IA.RAPID: NOT DETECTED
FLUBV AG UPPER RESP QL IA.RAPID: NOT DETECTED
INTERNAL CONTROL: NORMAL
INTERNAL CONTROL: NORMAL
Lab: NORMAL
Lab: NORMAL
S PYO AG THROAT QL: NEGATIVE
SARS-COV-2 AG UPPER RESP QL IA.RAPID: NOT DETECTED

## 2023-08-28 RX ORDER — AZITHROMYCIN 250 MG/1
TABLET, FILM COATED ORAL
Qty: 6 TABLET | Refills: 0 | Status: SHIPPED | OUTPATIENT
Start: 2023-08-28

## 2023-08-28 RX ORDER — TRIAMCINOLONE ACETONIDE 40 MG/ML
40 INJECTION, SUSPENSION INTRA-ARTICULAR; INTRAMUSCULAR ONCE
Status: COMPLETED | OUTPATIENT
Start: 2023-08-28 | End: 2023-08-28

## 2023-08-28 RX ADMIN — TRIAMCINOLONE ACETONIDE 40 MG: 40 INJECTION, SUSPENSION INTRA-ARTICULAR; INTRAMUSCULAR at 10:33

## 2023-08-28 NOTE — PROGRESS NOTES
Subjective   Rylie Salazar is a 53 y.o. female.     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    Review of Systems   HENT:  Positive for facial swelling and sore throat.    Respiratory:  Positive for cough. Negative for shortness of breath.    Cardiovascular:  Negative for chest pain and leg swelling.     Objective   Physical Exam  Vitals and nursing note reviewed.   Constitutional:       Appearance: Normal appearance.   HENT:      Right Ear: Tympanic membrane and ear canal normal.      Left Ear: Tympanic membrane and ear canal normal.      Mouth/Throat:      Mouth: Mucous membranes are moist.      Pharynx: Oropharynx is clear.   Cardiovascular:      Rate and Rhythm: Normal rate and regular rhythm.      Pulses: Normal pulses.      Heart sounds: Normal heart sounds.   Pulmonary:      Effort: Pulmonary effort is normal.      Breath sounds: Normal breath sounds. No wheezing or rhonchi.   Musculoskeletal:      Right lower leg: No edema.      Left lower leg: No edema.   Skin:     General: Skin is warm and dry.   Neurological:      General: No focal deficit present.      Mental Status: She is alert and oriented to person, place, and time.   Psychiatric:         Mood and Affect: Mood normal.         Behavior: Behavior normal.         Thought Content: Thought content normal.         Judgment: Judgment normal.       Assessment & Plan   Diagnoses and all orders for this visit:    1. Sore throat (Primary)  -     POC Rapid Strep A  -     POCT SARS-CoV-2 Antigen YASIR    2. Subacute maxillary sinusitis  -     triamcinolone acetonide (KENALOG-40) injection 40 mg    Other orders  -     azithromycin (Zithromax Z-Rashaad) 250 MG tablet; Take 2 tablets the first day, then  1 tablet daily for 4 days.  Start today  Dispense: 6 tablet; Refill: 0         Plan above plus sinus pill Tylenol  -Strep flu COVID negative test

## 2023-10-31 ENCOUNTER — OFFICE VISIT (OUTPATIENT)
Dept: FAMILY MEDICINE CLINIC | Facility: CLINIC | Age: 53
End: 2023-10-31
Payer: COMMERCIAL

## 2023-10-31 VITALS
SYSTOLIC BLOOD PRESSURE: 122 MMHG | BODY MASS INDEX: 27.92 KG/M2 | TEMPERATURE: 97.5 F | OXYGEN SATURATION: 99 % | HEART RATE: 91 BPM | RESPIRATION RATE: 16 BRPM | WEIGHT: 184.2 LBS | HEIGHT: 68 IN | DIASTOLIC BLOOD PRESSURE: 82 MMHG

## 2023-10-31 DIAGNOSIS — Z00.00 ROUTINE GENERAL MEDICAL EXAMINATION AT A HEALTH CARE FACILITY: Primary | ICD-10-CM

## 2023-10-31 DIAGNOSIS — M54.9 BACK PAIN, UNSPECIFIED BACK LOCATION, UNSPECIFIED BACK PAIN LATERALITY, UNSPECIFIED CHRONICITY: ICD-10-CM

## 2023-10-31 DIAGNOSIS — G45.9 TIA (TRANSIENT ISCHEMIC ATTACK): ICD-10-CM

## 2023-10-31 LAB
BILIRUB BLD-MCNC: NEGATIVE MG/DL
CLARITY, POC: CLEAR
COLOR UR: YELLOW
GLUCOSE UR STRIP-MCNC: NEGATIVE MG/DL
KETONES UR QL: NEGATIVE
LEUKOCYTE EST, POC: NEGATIVE
NITRITE UR-MCNC: NEGATIVE MG/ML
PH UR: 7 [PH] (ref 5–8)
PROT UR STRIP-MCNC: NEGATIVE MG/DL
RBC # UR STRIP: NEGATIVE /UL
SP GR UR: 1.01 (ref 1–1.03)
UROBILINOGEN UR QL: NORMAL
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
## 2023-11-01 LAB
ALBUMIN SERPL-MCNC: 4.9 G/DL (ref 3.5–5.2)
ALBUMIN/GLOB SERPL: 2.5 G/DL
ALP SERPL-CCNC: 73 U/L (ref 39–117)
ALT SERPL-CCNC: 19 U/L (ref 1–33)
AST SERPL-CCNC: 19 U/L (ref 1–32)
BASOPHILS # BLD AUTO: 0.1 10*3/MM3 (ref 0–0.2)
BASOPHILS NFR BLD AUTO: 1.7 % (ref 0–1.5)
BILIRUB SERPL-MCNC: 0.6 MG/DL (ref 0–1.2)
BUN SERPL-MCNC: 9 MG/DL (ref 6–20)
BUN/CREAT SERPL: 9.8 (ref 7–25)
CALCIUM SERPL-MCNC: 9.7 MG/DL (ref 8.6–10.5)
CHLORIDE SERPL-SCNC: 97 MMOL/L (ref 98–107)
CHOLEST SERPL-MCNC: 191 MG/DL (ref 0–200)
CO2 SERPL-SCNC: 32 MMOL/L (ref 22–29)
CREAT SERPL-MCNC: 0.92 MG/DL (ref 0.57–1)
CRP SERPL-MCNC: <0.3 MG/DL (ref 0–0.5)
EGFRCR SERPLBLD CKD-EPI 2021: 74.1 ML/MIN/1.73
EOSINOPHIL # BLD AUTO: 0.06 10*3/MM3 (ref 0–0.4)
EOSINOPHIL NFR BLD AUTO: 1 % (ref 0.3–6.2)
ERYTHROCYTE [DISTWIDTH] IN BLOOD BY AUTOMATED COUNT: 12.7 % (ref 12.3–15.4)
ERYTHROCYTE [SEDIMENTATION RATE] IN BLOOD BY WESTERGREN METHOD: 6 MM/HR (ref 0–30)
GLOBULIN SER CALC-MCNC: 2 GM/DL
GLUCOSE SERPL-MCNC: 85 MG/DL (ref 65–99)
HCT VFR BLD AUTO: 42 % (ref 34–46.6)
HDLC SERPL-MCNC: 55 MG/DL (ref 40–60)
HGB BLD-MCNC: 14.3 G/DL (ref 12–15.9)
IMM GRANULOCYTES # BLD AUTO: 0.01 10*3/MM3 (ref 0–0.05)
IMM GRANULOCYTES NFR BLD AUTO: 0.2 % (ref 0–0.5)
LDLC SERPL CALC-MCNC: 121 MG/DL (ref 0–100)
LYMPHOCYTES # BLD AUTO: 2.21 10*3/MM3 (ref 0.7–3.1)
LYMPHOCYTES NFR BLD AUTO: 37.5 % (ref 19.6–45.3)
MCH RBC QN AUTO: 30.5 PG (ref 26.6–33)
MCHC RBC AUTO-ENTMCNC: 34 G/DL (ref 31.5–35.7)
MCV RBC AUTO: 89.6 FL (ref 79–97)
MONOCYTES # BLD AUTO: 0.43 10*3/MM3 (ref 0.1–0.9)
MONOCYTES NFR BLD AUTO: 7.3 % (ref 5–12)
NEUTROPHILS # BLD AUTO: 3.09 10*3/MM3 (ref 1.7–7)
NEUTROPHILS NFR BLD AUTO: 52.3 % (ref 42.7–76)
NRBC BLD AUTO-RTO: 0 /100 WBC (ref 0–0.2)
PLATELET # BLD AUTO: 383 10*3/MM3 (ref 140–450)
POTASSIUM SERPL-SCNC: 3.3 MMOL/L (ref 3.5–5.2)
PROT SERPL-MCNC: 6.9 G/DL (ref 6–8.5)
RBC # BLD AUTO: 4.69 10*6/MM3 (ref 3.77–5.28)
SODIUM SERPL-SCNC: 140 MMOL/L (ref 136–145)
T4 FREE SERPL-MCNC: 1.79 NG/DL (ref 0.93–1.7)
TRIGL SERPL-MCNC: 83 MG/DL (ref 0–150)
TSH SERPL DL<=0.005 MIU/L-ACNC: 1.11 UIU/ML (ref 0.27–4.2)
VLDLC SERPL CALC-MCNC: 15 MG/DL (ref 5–40)
WBC # BLD AUTO: 5.9 10*3/MM3 (ref 3.4–10.8)

## 2023-11-03 RX ORDER — POTASSIUM CHLORIDE 20 MEQ/1
20 TABLET, EXTENDED RELEASE ORAL DAILY
Qty: 90 TABLET | Refills: 0 | Status: SHIPPED | OUTPATIENT
Start: 2023-11-03

## 2023-11-09 RX ORDER — SEMAGLUTIDE 2.4 MG/.75ML
INJECTION, SOLUTION SUBCUTANEOUS
Qty: 3 ML | Refills: 12 | Status: SHIPPED | OUTPATIENT
Start: 2023-11-09

## 2023-11-09 NOTE — TELEPHONE ENCOUNTER
Rx Refill Note  Requested Prescriptions     Pending Prescriptions Disp Refills    Wegovy 2.4 MG/0.75ML solution auto-injector [Pharmacy Med Name: WEGOVY PEN INJ 0.75ML 4'S 2.4MG] 3 mL 12     Sig: INJECT 2.4 MG UNDER THE SKIN INTO THE APPROPRIATE AREA AS DIRECTED ONCE PER WEEK      Last office visit with prescribing clinician: 10/31/23   Last telemedicine visit with prescribing clinician: Visit date not found   Next office visit with prescribing clinician: 12/22/23    Ruthann Shin MA  11/09/23, 07:29 CST

## 2023-12-04 RX ORDER — BUPROPION HYDROCHLORIDE 150 MG/1
150 TABLET, EXTENDED RELEASE ORAL EVERY MORNING
Qty: 90 TABLET | Refills: 3 | Status: SHIPPED | OUTPATIENT
Start: 2023-12-04

## 2023-12-04 NOTE — TELEPHONE ENCOUNTER
Rx Refill Note  Requested Prescriptions     Pending Prescriptions Disp Refills    buPROPion SR (WELLBUTRIN SR) 150 MG 12 hr tablet [Pharmacy Med Name: BUPROPION HCL  MG TABLET] 90 tablet 3     Sig: TAKE 1 TABLET BY MOUTH EVERY DAY IN THE MORNING      Last office visit with prescribing clinician: 10/31/2023   Last telemedicine visit with prescribing clinician: Visit date not found   Next office visit with prescribing clinician: 12/22/2023       Ruthann Shin MA  12/04/23, 08:26 CST

## 2023-12-08 RX ORDER — SEMAGLUTIDE 2.4 MG/.75ML
2.4 INJECTION, SOLUTION SUBCUTANEOUS WEEKLY
Qty: 3 ML | Refills: 12 | Status: SHIPPED | OUTPATIENT
Start: 2023-12-08

## 2024-01-19 ENCOUNTER — OFFICE VISIT (OUTPATIENT)
Dept: FAMILY MEDICINE CLINIC | Facility: CLINIC | Age: 54
End: 2024-01-19
Payer: COMMERCIAL

## 2024-01-19 VITALS
HEIGHT: 68 IN | BODY MASS INDEX: 26.52 KG/M2 | HEART RATE: 89 BPM | OXYGEN SATURATION: 98 % | WEIGHT: 175 LBS | DIASTOLIC BLOOD PRESSURE: 85 MMHG | SYSTOLIC BLOOD PRESSURE: 123 MMHG | TEMPERATURE: 98 F

## 2024-01-19 DIAGNOSIS — J01.00 ACUTE NON-RECURRENT MAXILLARY SINUSITIS: Primary | ICD-10-CM

## 2024-01-19 DIAGNOSIS — E66.3 OVERWEIGHT: ICD-10-CM

## 2024-01-19 DIAGNOSIS — R05.1 ACUTE COUGH: ICD-10-CM

## 2024-01-19 LAB
EXPIRATION DATE: NORMAL
FLUAV AG UPPER RESP QL IA.RAPID: NOT DETECTED
FLUBV AG UPPER RESP QL IA.RAPID: NOT DETECTED
INTERNAL CONTROL: NORMAL
Lab: NORMAL
SARS-COV-2 AG UPPER RESP QL IA.RAPID: NOT DETECTED

## 2024-01-19 RX ORDER — TRIAMCINOLONE ACETONIDE 40 MG/ML
40 INJECTION, SUSPENSION INTRA-ARTICULAR; INTRAMUSCULAR ONCE
Status: COMPLETED | OUTPATIENT
Start: 2024-01-19 | End: 2024-01-19

## 2024-01-19 RX ORDER — AZITHROMYCIN 250 MG/1
TABLET, FILM COATED ORAL
Qty: 6 TABLET | Refills: 0 | Status: SHIPPED | OUTPATIENT
Start: 2024-01-19

## 2024-01-19 RX ORDER — PREDNISONE 10 MG/1
TABLET ORAL
Qty: 21 TABLET | Refills: 0 | Status: SHIPPED | OUTPATIENT
Start: 2024-01-19

## 2024-01-19 RX ADMIN — TRIAMCINOLONE ACETONIDE 40 MG: 40 INJECTION, SUSPENSION INTRA-ARTICULAR; INTRAMUSCULAR at 10:08
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
## 2024-01-29 DIAGNOSIS — J01.00 ACUTE NON-RECURRENT MAXILLARY SINUSITIS: ICD-10-CM

## 2024-01-30 RX ORDER — PREDNISONE 10 MG/1
TABLET ORAL
Qty: 21 TABLET | Refills: 0 | Status: SHIPPED | OUTPATIENT
Start: 2024-01-30

## 2024-01-30 RX ORDER — AZITHROMYCIN 250 MG/1
TABLET, FILM COATED ORAL
Qty: 6 TABLET | Refills: 0 | Status: SHIPPED | OUTPATIENT
Start: 2024-01-30

## 2024-01-30 NOTE — TELEPHONE ENCOUNTER
See patients note regarding refill request.  Please advise if patient needs to be seen    Rx Refill Note  Requested Prescriptions     Pending Prescriptions Disp Refills    azithromycin (Zithromax) 250 MG tablet 6 tablet 0     Sig: Take 2 tablets the first day, then 1 tablet daily for 4 days.    predniSONE (DELTASONE) 10 MG (21) dose pack 21 tablet 0     Sig: Use as directed on package      Last office visit with prescribing clinician: 1/19/2024   Last telemedicine visit with prescribing clinician: Visit date not found   Next office visit with prescribing clinician:   Ruthann Shin MA  01/30/24, 07:32 CST

## 2024-03-23 ENCOUNTER — PATIENT MESSAGE (OUTPATIENT)
Dept: FAMILY MEDICINE CLINIC | Facility: CLINIC | Age: 54
End: 2024-03-23
Payer: COMMERCIAL

## 2024-03-23 DIAGNOSIS — Z12.31 SCREENING MAMMOGRAM FOR BREAST CANCER: Primary | ICD-10-CM

## 2024-03-25 NOTE — TELEPHONE ENCOUNTER
From: Rylie Salazar  To: Yusef Denson  Sent: 3/23/2024 7:30 PM CDT  Subject: Mammogram appointment     Can you set up a mammogram appointment for the first week of April. Wednesday, Thursday or Friday. Any time will work. Thank you.

## 2024-04-02 ENCOUNTER — OFFICE VISIT (OUTPATIENT)
Dept: FAMILY MEDICINE CLINIC | Facility: CLINIC | Age: 54
End: 2024-04-02
Payer: COMMERCIAL

## 2024-04-02 VITALS
WEIGHT: 172 LBS | HEIGHT: 68 IN | OXYGEN SATURATION: 98 % | TEMPERATURE: 98 F | HEART RATE: 80 BPM | SYSTOLIC BLOOD PRESSURE: 139 MMHG | BODY MASS INDEX: 26.07 KG/M2 | DIASTOLIC BLOOD PRESSURE: 80 MMHG

## 2024-04-02 DIAGNOSIS — R05.1 ACUTE COUGH: ICD-10-CM

## 2024-04-02 DIAGNOSIS — J06.9 UPPER RESPIRATORY TRACT INFECTION, UNSPECIFIED TYPE: Primary | ICD-10-CM

## 2024-04-02 LAB
EXPIRATION DATE: NORMAL
EXPIRATION DATE: NORMAL
FLUAV AG NPH QL: NEGATIVE
FLUBV AG NPH QL: NEGATIVE
INTERNAL CONTROL: NORMAL
INTERNAL CONTROL: NORMAL
Lab: NORMAL
Lab: NORMAL
SARS-COV-2 AG UPPER RESP QL IA.RAPID: NOT DETECTED

## 2024-04-02 RX ORDER — TRIAMCINOLONE ACETONIDE 40 MG/ML
40 INJECTION, SUSPENSION INTRA-ARTICULAR; INTRAMUSCULAR ONCE
Status: COMPLETED | OUTPATIENT
Start: 2024-04-02 | End: 2024-04-02

## 2024-04-02 RX ORDER — PREDNISONE 10 MG/1
TABLET ORAL
Qty: 21 TABLET | Refills: 0 | Status: SHIPPED | OUTPATIENT
Start: 2024-04-02

## 2024-04-02 RX ORDER — AZITHROMYCIN 250 MG/1
TABLET, FILM COATED ORAL
Qty: 6 TABLET | Refills: 0 | Status: SHIPPED | OUTPATIENT
Start: 2024-04-02

## 2024-04-02 RX ADMIN — TRIAMCINOLONE ACETONIDE 40 MG: 40 INJECTION, SUSPENSION INTRA-ARTICULAR; INTRAMUSCULAR at 10:42
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
## 2024-04-24 NOTE — ANESTHESIA PREPROCEDURE EVALUATION
Anesthesia Evaluation     Patient summary reviewed and Nursing notes reviewed   history of anesthetic complications: PONV         Airway   Mallampati: II  TM distance: >3 FB  Neck ROM: full  no difficulty expected  Dental - normal exam     Pulmonary - negative pulmonary ROS and normal exam   Cardiovascular - negative cardio ROS and normal exam  Exercise tolerance: good (4-7 METS)    Rhythm: regular  Rate: normal        Neuro/Psych- negative ROS  GI/Hepatic/Renal/Endo      Musculoskeletal (-) negative ROS    Abdominal  - normal exam   Substance History - negative use     OB/GYN          Other - negative ROS                                       Anesthesia Plan    ASA 1     general     intravenous induction   Anesthetic plan and risks discussed with patient.       [FreeTextEntry1] : I, Shahida Schmitt ([facility], [facility]) assisted in filling out this chart under the dictation of Dr. Flynn Lorenzo on 04/19/2024.

## 2024-06-25 ENCOUNTER — TELEMEDICINE (OUTPATIENT)
Dept: FAMILY MEDICINE CLINIC | Facility: TELEHEALTH | Age: 54
End: 2024-06-25
Payer: COMMERCIAL

## 2024-06-25 ENCOUNTER — E-VISIT (OUTPATIENT)
Dept: FAMILY MEDICINE CLINIC | Facility: TELEHEALTH | Age: 54
End: 2024-06-25
Payer: COMMERCIAL

## 2024-06-25 DIAGNOSIS — U07.1 COVID-19: Primary | ICD-10-CM

## 2024-06-25 PROCEDURE — 99213 OFFICE O/P EST LOW 20 MIN: CPT | Performed by: NURSE PRACTITIONER

## 2024-06-25 RX ORDER — IBUPROFEN 800 MG/1
800 TABLET ORAL EVERY 8 HOURS PRN
Qty: 30 TABLET | Refills: 0 | Status: SHIPPED | OUTPATIENT
Start: 2024-06-25

## 2024-06-25 NOTE — E-VISIT ESCALATED
Patient escalated   Provider LEONCIO Floyd chose to escalate patient to another level of care because: Insufficient information to diagnose   Patient was sent the following message:   Based on the information you've provided us, you need to take another step to get care. If you would like to discuss the anti-viral treatment Paxlovid for Covid, please sign up for a video visit.   What to do now:   Setup a video visit   Please, schedule your video visit   Video visit     You won't be charged for your eVisit. If you paid with a credit card, the charge will be reversed.   Chief Complaint: Cold, flu, COVID, sinus, hay fever, or seasonal allergies   Patient introduction   Patient is 54-year-old female with cough, congestion, sore throat, headache, chills, fatigue, and diarrhea that started less than 48 hours ago.   COVID-19 testing history, vaccination status, and exposure:    Patient was tested for COVID-19 within the last 24 hours. Test result was positive.    Vaccinated with the updated 4711-0312 COVID-19 vaccine (Pfizer-BioNTech or Moderna vaccine after September 12, 2023; or Novavax vaccine after October 3, 2023). Received their most recent dose of the vaccine more than 14 days ago.   Risk factors for severe disease from COVID-19 infection    Age 50 or older.    Higher risk for severe disease from COVID-19 because of BMI >= 25.    Mostly sedentary lifestyle.   General presentation   Symptoms came on suddenly.   Fever:    No fever.   Sinus and nasal symptoms:    Clear nasal drainage.    Nasal drainage is thick.    Postnasal drip.    Sinus pain or pressure on or around the eyes.    Patient first noticed sinus pain less than 5 days ago.    Sinus pain is worse with Valsalva.    No nasal discharge.    No itchy nose or sneezing.   Throat symptoms:    Sore throat. No redness in the back of the throat. No redness on the tonsils.    Patient can swallow liquids and solid foods with ease.   Head and body  "aches:    Headache described as moderate (4 to 6 on a scale of 1 to 10).    Chills.    Fatigue.    No sweats.    No myalgia.   Cough:    Cough is worse at night/while sleeping.    Cough is not productive of sputum.   Wheezing and shortness of breath:    No COPD diagnosis.    No asthma diagnosis.    No wheezing.    No shortness of breath.    No previous albuterol inhaler use for URIs, bronchitis, or pneumonia.    No previous steroid inhaler use for URIs, bronchitis, or pneumonia.   Chest pain:    No chest pain.   Ear symptoms:    Current symptoms include pain, pressure, and fullness in the ear(s).   Dizziness:    No dizziness.   Flu exposure:    Received a flu vaccine more than 6 months ago.   Not taking any over-the-counter medications for current symptoms.   Review of red flags/alarm symptoms:    No changes in alertness or awareness.    No paroxysmal cough followed by whoop on inspiration    No symptoms suggesting airway obstruction.    No symptoms suggesting intracranial hemorrhage.    No decreased urination.    No blue or gray coloring present in face, lips, or nail beds.    No swelling, pain, redness, or increased warmth in the calf or lower part of ONE leg only.    No proptosis.   Pregnancy/menstrual status/breastfeeding:    Patient is postmenopausal.   Self-exam:    Height: 5' 7\"    Weight: 170 lbs    No tonsillar edema.    Tonsils appear normal.    No palatal petechiae.    Neck lymph nodes feel normal.   Current medications   Currently taking Wegovy 2.4 MG/0.75ML solution auto-injector, buPROPion  MG 12 hr tablet, and ALLEGRA ALLERGY PO.   Medication allergies   None.   Medication contraindication review   Patient may be eligible for treatment with Paxlovid. Use of Paxlovid with drugs that either induce CYP3 or are highly dependent on CYP3 for clearance may lead to significant drug interactions.   No history of anaphylactic reaction to beta-lactam antibiotics; aspirin triad; blood dyscrasia; bone marrow " depression; catecholamine-releasing paraganglioma; coronary artery disease; coagulation disorder; congenital long QT syndrome; depression; electrolyte abnormalities; fungal infection; GI bleeding; GI obstruction; G6PD deficiency; heart arrhythmia; hypertension; mononucleosis; myasthenia; recent myocardial infarction; NSAID-induced asthma/urticaria; Parkinson's disease; pheochromocytoma; porphyria; Reye syndrome; seizure disorder; ulcerative colitis; and urinary retention.   No history of metoclopramide-associated dystonic reaction and tardive dyskinesia.   No known history of amoxicillin-clavulanate-associated cholestatic jaundice or hepatic impairment.   No known history of azithromycin-associated cholestatic jaundice or hepatic impairment.   Past medical history   Immune conditions:   No immunocompromising conditions.   No history of cancer.   Social history   Never smoked tobacco.   Patient-submitted comments   Patient was asked if they had anything to add about their symptoms. Patient writes: Covid positive home test.  also with Covid. .   Patient did not request an excuse note.   Assessment:   Patient determined to need a level of care not appropriate to be delivered through eVisit.   Plan:   Patient informed of need to seek in-person care   ----------   Electronically signed by LEONCIO Floyd on 2024-06-25 at 18:37PM   ----------   Patient Interview Transcript:   Which of these symptoms are bothering you? Select all that apply.    Cough    Stuffed-up nose or sinuses    Sore throat    Headache    Chills    Fatigue or tiredness    Diarrhea   Not selected:    Shortness of breath    Runny nose    Itchy or watery eyes    Itchy nose or sneezing    Loss of smell or taste    Hoarse voice or loss of voice    Fever    Sweats    Muscle or body aches    Nausea or vomiting    I don't have any of these symptoms   When did your current symptoms start? Select one.    Less than 48 hours ago   Not selected:     3 to 5 days ago    6 to 9 days ago    10 to 14 days ago    2 to 3 weeks ago    3 to 4 weeks ago    More than a month ago   Did your symptoms come on suddenly or gradually? Select one.    Suddenly   Not selected:    Gradually    I'm not sure   Since your current symptoms started, have you been tested for COVID-19? This includes home self-tests as well as nose swab or saliva tests done at a doctor's office, lab, or testing site. Select one.    Yes   Not selected:    No   When was your most recent COVID-19 test? Select one.    Within the last 24 hours   Not selected:    Within the last week (specify date as MM/DD/YY)    7 to 14 days ago    15 to 30 days ago    More than 1 month ago   What was the result of your most recent COVID-19 test? Select one.    Positive   Not selected:    Negative   Have you gotten the [id number] updated COVID-19 vaccine? This means either the updated Pfizer-BioNTech or Moderna vaccine after September 12, 2023; or the updated Novavax vaccine after October 3, 2023. Select one.    Yes   Not selected:    No   When did you get the updated COVID-19 vaccine? Select one.    More than 14 days ago   Not selected:    Less than 48 hours (2 days) ago    48 to 72 hours (3 days) ago    3 to 5 days ago    5 to 7 days ago    7 to 14 days ago   Since your symptoms started, have you felt dizzy? Select one.    No   Not selected:    Yes, but I can still do my regular daily activities    Yes, and it makes it hard to stand, walk, or do daily activities   Do you have chest pain? You might also feel it as discomfort, aching, tightness, or squeezing in the chest. Select one.    No   Not selected:    Yes   You mentioned having a headache. On a scale of 1 to 10, how severe is your headache pain? Select one.    Moderate (4 to 6)   Not selected:    Mild (1 to 3)    Severe (7 to 9)    Unbearable (10)    The worst headache of my life (10+)   Do you cough so hard that it's made you gag or vomit? By gag, we mean has your  "coughing made you choke or dry heave? Select all that apply.    No   Not selected:    Yes, my coughing has made me gag    Yes, my coughing has made me vomit   Does your cough come in spasms of 10 to 20 coughs in a row, followed by a loud whoop when breathing in? Select one.    No   Not selected:    Yes   When is your cough the worst? Select all that apply.    At nighttime, or while I'm sleeping   Not selected:    In the morning, or when I wake up    During the day    I haven't noticed a difference depending on time of day   Are you coughing up mucus or phlegm? Select one.    No, my cough is dry   Not selected:    Yes, a little    Yes, a lot   Do you feel sinus pain or pressure in any of these areas?    Around my eyes   Not selected:    In my forehead    Behind my nose    In my cheeks    In my upper teeth or jaw    No   When did you first notice your sinus pain or pressure? Select one.    Less than 5 days ago   Not selected:    5 to 9 days ago    10 to 14 days ago    2 to 4 weeks ago    1 month ago or longer   Does coughing, sneezing, or leaning forward make your sinuses feel worse? Select one.    Yes   Not selected:    No   What color is your nasal drainage? Select one.    Clear   Not selected:    White    Yellow or yellowish    Green or greenish    My nose is stuffed but not draining or running   Is your nasal drainage thick or thin? Select one.    Thick   Not selected:    Thin   Is there any drainage (mucus) going down the back of your throat? This kind of drainage is also called \"postnasal drip.\" It can cause frequent throat clearing. Select one.    Yes   Not selected:    No, not that I know of   Can you swallow liquids and solid foods? A sore throat may be painful when swallowing, but it shouldn't prevent you from swallowing. Select one.    Yes, with ease   Not selected:    Yes, but it's uncomfortable    Yes, but it's painful    It's hard to swallow anything because it feels like liquids and food get stuck in my " "throat    No, I can't swallow anything, liquid or solid foods   Is your throat pain worse on one side than the other? Select one.    No, it's the same on both sides   Not selected:    Yes, it's worse on the right side    Yes, it's worse on the left side   To recommend the best treatment, we need to see photos of your throat. You can have someone else take the photo, or use a mirror. If you choose not to send photos, you can still continue this interview, but your treatment options may be affected. Select one.    I'll take the photos myself   Not selected:    Someone can take the photos for me    I'd rather not send photos   Send at least 2 photos for review. - Switch the flash to On (not auto). - Stand close to a mirror. - Don't use the \"selfie\" camera. Instead, turn the phone around and tilt it slightly up. - Looking in the mirror, tap to focus on the back of the throat, not the teeth. - Say \"Ah\" so the back of your throat is visible, and take the photo. - Before sending, make sure the photos are clear and the throat is in focus.    Upload 1    Upload 2   Not selected:    Upload 3   Have you urinated at least 3 times in the last 24 hours? Select one.    Yes   Not selected:    No   Do your face, lips, or nail beds appear blue or gray? Select one.    No   Not selected:    Yes   Do you have any swelling, pain, redness, or increased warmth in the calf or lower part of ONE leg only? Select one.    No   Not selected:    Yes   Changes in alertness or awareness may mean you need emergency care. Since your symptoms started, have you had any of these? Select all that apply.    None of the above   Not selected:    Confusion    Slurred speech    Not knowing where you are or what day it is    Difficulty staying conscious    Fainting or passing out   Do your symptoms include a whistling sound, or wheezing, when you breathe? Select one.    No   Not selected:    Yes   Since your symptoms started, have you noticed that one or both " of your eyes is bulging or poking out? Select one.    No   Not selected:    Yes   Do you have any of these symptoms in your ear(s)? Select all that apply.    Pain    Pressure    Fullness   Not selected:    Crackling or popping    Plugged or blocked sensation    None of the above   Are your tonsils larger than usual?    No, not that I can tell   Not selected:    Yes    I've had my tonsils removed   Is there redness in the back of your throat or on your tonsils? Select all that apply.    No, not that I can see   Not selected:    Yes, in the back of the throat    Yes, on the tonsils   Is there any white or yellow pus on your tonsils?    No, not that I can see   Not selected:    Yes   Are there red spots on the roof of your mouth or the back of your throat?    No, not that I can see   Not selected:    Yes   Are your glands/lymph nodes swollen, or does it hurt when you touch them?    No, not that I can tell   Not selected:    Yes   Have you ever been diagnosed with asthma? Select one.    No   Not selected:    Yes   Have you ever been prescribed albuterol to use for wheezing, cough, or shortness of breath caused by a cold, bronchitis, or pneumonia? Albuterol (ProAir, Proventil, Ventolin) is prescribed as an inhaler or a solution to be used with a nebulizer machine. Select one.    No, not that I know of   Not selected:    Yes   Have you ever been prescribed a steroid inhaler to use for wheezing, cough, or shortness of breath caused by a cold, bronchitis, or pneumonia? Some examples of steroid inhalers include Pulmicort, Flovent, Qvar, and Alvesco. Select one.    No, not that I know of   Not selected:    Yes   Have you ever been diagnosed with chronic obstructive pulmonary disease (COPD)? Select one.    No, not that I know of   Not selected:    Yes   Do any of these apply to you? Select all that apply.    None of the above   Not selected:    I've been hospitalized within the last 5 days    I have diabetes    I'm in close  contact with a child in    Have you had a flu shot this season? Select one.    Yes, more than 6 months ago   Not selected:    Yes, less than 2 weeks ago    Yes, 2 to 4 weeks ago    Yes, 1 to 3 months ago    Yes, 3 to 6 months ago    No   Have you gone through menopause? Select one.    Yes   Not selected:    No    I'm going through it now   The flu and COVID-19 can be more serious for people in certain groups. The next few questions help us figure out if you or anyone you live with is at higher risk for complications from these infections. Do any of these statements apply to you? Select all that apply.    None of the above   Not selected:    I'm     I'm     I'm Black    I'm  or    Do you smoke tobacco? Select one.    No   Not selected:    Yes, every day    Yes, some days    No, I quit   Do you have a mostly inactive lifestyle? Answer yes if all of these are true: - You spend at least 6 hours a day sitting or lying down - You get less than 2 and a half hours per week of moderate exercise such as walking fast - You get less than 1 hour and 15 minutes per week of intense exercise such as jogging or running Select one.    Yes   Not selected:    No   Do you have any of these conditions? Select all that apply.    None of the above   Not selected:    Chronic lung disease, such as cystic fibrosis or interstitial fibrosis    Heart disease, such as congenital heart disease, congestive heart failure, or coronary artery disease    Disorder of the brain, spinal cord, or nerves and muscles, such as dementia, cerebral palsy, epilepsy, muscular dystrophy, or developmental delay    Metabolic disorder or mitochondrial disease    Cerebrovascular disease, such as stroke or another condition affecting the blood vessels or blood supply to the brain    Down syndrome    Mood disorder, including depression or schizophrenia spectrum disorders    Substance use disorder, such as alcohol, opioid,  or cocaine use disorder    Tuberculosis    Primary immunodeficiency   Do you live in a group care setting? Examples include: - Nursing home - Residential care - Psychiatric treatment facility - Group home - Dormitory - Board and care home - Homeless shelter - Foster care setting Select one.    No   Not selected:    Yes   Are you a healthcare worker? Select one.    No   Not selected:    Yes   People with a very high body mass index (BMI) are at higher risk for developing complications from the flu and severe illness from COVID-19. To determine your BMI, we need to know your weight and height. Please enter your weight (in pounds).    Weight   Please enter your height.    Height   Do you have any of these conditions that can affect the immune system? Scroll to see all options. Select all that apply.    None of these   Not selected:    History of bone marrow transplant    Chronic kidney disease    Chronic liver disease (including cirrhosis)    HIV/AIDS    Inflammatory bowel disease (Crohn's disease or ulcerative colitis)    Lupus    Moderate to severe plaque psoriasis    Multiple sclerosis    Rheumatoid arthritis    Sickle cell anemia    Alpha or beta thalassemia    History of kidney, liver, heart, or other solid organ transplant    History of liver, heart, or other solid organ transplant    History of spleen removal    An autoimmune disorder not listed here (specify)    A condition requiring treatment with long-term use of oral steroids (such as prednisone, prednisolone, or dexamethasone) (specify)   Have you ever been diagnosed with cancer? Select one.    No   Not selected:    Yes, I have cancer now    Yes, but I'm in remission   The flu and COVID-19 can be more serious for people in certain groups. Do any of these apply to the people who live with you? Select all that apply.    None of the above   Not selected:    Under age 5    Over age 65            Black     or      Pregnant    Has given birth, had a miscarriage, had a pregnancy loss, or had an  in the last 2 weeks   Does any member of your household have any of these medical conditions? Select all that apply.    None of the above   Not selected:    Asthma    Disorders of the brain, spinal cord, or nerves and muscles, such as dementia, cerebral palsy, epilepsy, muscular dystrophy, or developmental delay    Chronic lung disease, such as COPD or cystic fibrosis    Heart disease, such as congenital heart disease, congestive heart failure, or coronary artery disease    Cerebrovascular disease, such as stroke or another condition affecting the blood vessels or blood supply to the brain    Blood disorders, such as sickle cell disease    Diabetes    Metabolic disorders such as inherited metabolic disorders or mitochondrial disease    Kidney disorders    Liver disorders    Weakened immune system due to illness or medications such as chemotherapy or steroids    Children under the age of 19 who are on long-term aspirin therapy    Extreme obesity (BMI > 40)   Do you have any of these conditions? Scroll to see all options. Select all that apply.    None of the above   Not selected:    Blockage or narrowing of the blood vessels of the heart    Blood dyscrasia, such anemia, leukemia, lymphoma, or myeloma    Bone marrow depression    Catecholamine-releasing paraganglioma    Congenital long QT syndrome    Depression    Difficulty urinating or completely emptying your bladder    Uncorrected electrolyte abnormalities    Fungal infection    Gastrointestinal (GI) bleeding    Gastrointestinal (GI) obstruction    Recent heart attack    High blood pressure    Irregular heartbeat or heart rhythm    Mononucleosis (mono)    Myasthenia gravis    Parkinson's disease    Pheochromocytoma    Reye syndrome    Seizure disorder    Thyroid disease    Ulcerative colitis   Have you ever had either of these conditions? Select all that apply.    No   Not  selected:    Metoclopramide-associated dystonic reaction    Tardive dyskinesia   Just a few more questions about medications, and then you're finished. Have you used any non-prescription medications or nasal sprays for your current symptoms? Examples include saline sprays, decongestants, NyQuil, and Tylenol. Select one.    No   Not selected:    Yes   Have you taken any monoamine oxidase inhibitor (MAOI) medications in the last 14 days? Examples include rasagiline (Azilect), selegiline (Eldepryl, Zelapar), isocarboxazid (Marplan), phenelzine (Nardil), and tranylcypromine (Parnate). Select one.    No, not that I know of   Not selected:    Yes   Do you take Kynmobi or Apokyn (apomorphine)? Select one.    No   Not selected:    Yes   Are you still taking these medications listed in your medical record? If you're not taking any of these, click Next. Select all that apply.    Wegovy 2.4 MG/0.75ML solution auto-injector    buPROPion  MG 12 hr tablet    ALLEGRA ALLERGY PO   Are you taking any other medications, vitamins, or supplements? Select one.    No   Not selected:    Yes   Have you ever had an allergic or bad reaction to any medication? Select one.    No   Not selected:    Yes   Are you allergic to milk or to the proteins found in milk (for example, whey or casein)? A milk allergy is different from lactose intolerance. Select one.    No, not that I know of   Not selected:    Yes   Have you ever had jaundice or liver problems as a result of taking amoxicillin-clavulanate (Augmentin)? Jaundice is a condition in which the skin and the whites of the eyes turn yellow. Select all that apply.    No, not that I know of   Not selected:    Yes, jaundice    Yes, liver problems   Have you ever had jaundice or liver problems as a result of taking azithromycin (Zithromax, Zmax)? Jaundice is a condition in which the skin and the whites of the eyes turn yellow. Select all that apply.    No, not that I know of   Not selected:     Yes, jaundice    Yes, liver problems   Do you need a doctor's note? A doctor's note confirms that you received care today and states when you can return to school or work. It does not contain information about your diagnosis or treatment plan. Your provider will make the final decision on whether to give you a doctor's note and for how long. Doctor's notes CANNOT be backdated. We can't provide medical leave paperwork through this type of visit. If more paperwork is needed to request time off, contact your primary care provider. Select one.    No   Not selected:    Today only (1 day)    Today and tomorrow (2 days)    3 days    5 days    7 days   Is there anything you'd like to add about your symptoms? Please limit your comments to the symptoms covered in this interview. If you include comments about other concerns, your provider may recommend that you be seen in person.    __Covid positive home test.  also with Covid. __   ----------   Medical history   Medical history data does not currently exist for this patient.

## 2024-06-25 NOTE — PROGRESS NOTES
HPI  Rylie Salazar is a 54 y.o. female  presents with complaint of testing positive for Covid today. Symptoms started last night including fatigue, earache, severe sore throat, nose burning, congestion, cough, chills, sweats, mild SOA with exertion, diarrhea. Denies nausea, vomiting.  sick with covid as well. Would like to take paxlovid. Has had covid and flu vaccines. Has not taken any meds OTC.   GFR 72.    Review of Systems    Past Medical History:   Diagnosis Date    Acute maxillary sinusitis     Acute otitis media     Acute serous otitis media     Acute vaginitis     Allergic     Allergic rhinitis     Anal bleeding     noticed blood from anal area when wiping    Crushing injury of hand     no fracture. swelling only       Encounter for gynecological examination (general) (routine) without abnormal findings     H/O mammogram 2015    Menopausal flushing     PONV (postoperative nausea and vomiting)     Rhinitis     Temporomandibular joint pain dysfunction syndrome     Tendinitis     Left foot    Upper respiratory infection     Vaginal discharge        Family History   Problem Relation Age of Onset    Stroke Mother     Stroke Paternal Grandfather        Social History     Socioeconomic History    Marital status:    Tobacco Use    Smoking status: Former     Current packs/day: 0.00     Average packs/day: 1 pack/day for 20.0 years (20.0 ttl pk-yrs)     Types: Cigarettes     Start date:      Quit date:      Years since quittin.4    Smokeless tobacco: Never   Vaping Use    Vaping status: Never Used   Substance and Sexual Activity    Alcohol use: Yes     Comment: occasionally    Drug use: No    Sexual activity: Yes     Partners: Male     Birth control/protection: Surgical         LMP 2011 Comment: Still have ovaries    PHYSICAL EXAM  Physical Exam   Constitutional: She appears well-developed and well-nourished.   HENT:   Head: Normocephalic.   Nose: Nose normal.   Neck: Neck  normal appearance.  Pulmonary/Chest: Effort normal.   Neurological: She is alert.   Psychiatric: She has a normal mood and affect. Her speech is normal.       Diagnoses and all orders for this visit:    1. COVID-19 (Primary)  -     Nirmatrelvir & Ritonavir, 300mg/100mg, (PAXLOVID); Take 3 tablets by mouth 2 (Two) Times a Day for 5 days.  Dispense: 30 tablet; Refill: 0  -     ibuprofen (ADVIL,MOTRIN) 800 MG tablet; Take 1 tablet by mouth Every 8 (Eight) Hours As Needed for Mild Pain or Fever.  Dispense: 30 tablet; Refill: 0          FOLLOW-UP  As discussed during visit with [facility], if symptoms worsen or fail to improve, follow-up with PCP/Urgent Care/Emergency Department.    Patient verbalizes understanding of medications, instructions for treatment and follow-up.    Gina Jovel, LEONCIO  06/25/2024  18:50 EDT    The use of a video visit has been reviewed with the patient and verbal informed consent has been obtained. Myself and Rylie Salazar participated in this visit. The patient is located in [place], [place], and I am located in [place], [place]. [employer] and [employer] were utilized.

## 2024-06-27 RX ORDER — BUPROPION HYDROCHLORIDE 150 MG/1
150 TABLET, EXTENDED RELEASE ORAL EVERY MORNING
Qty: 90 TABLET | Refills: 1 | Status: SHIPPED | OUTPATIENT
Start: 2024-06-27

## 2024-06-27 NOTE — TELEPHONE ENCOUNTER
Rx Refill Note  Requested Prescriptions     Pending Prescriptions Disp Refills    buPROPion SR (WELLBUTRIN SR) 150 MG 12 hr tablet [Pharmacy Med Name: BUPROPION HCL SR TABS 150MG] 90 tablet 3     Sig: TAKE 1 TABLET EVERY MORNING      Last office visit with prescribing clinician: 10/31/2023   Last telemedicine visit with prescribing clinician: Visit date not found   Next office visit with prescribing clinician: Visit date not found       {TIP  Please add Last Relevant Lab 12/22/23    Ruthann Shin MA  06/27/24, 13:54 CDT

## 2024-07-03 ENCOUNTER — OFFICE VISIT (OUTPATIENT)
Dept: FAMILY MEDICINE CLINIC | Facility: CLINIC | Age: 54
End: 2024-07-03
Payer: COMMERCIAL

## 2024-07-03 VITALS
DIASTOLIC BLOOD PRESSURE: 72 MMHG | TEMPERATURE: 98 F | SYSTOLIC BLOOD PRESSURE: 101 MMHG | WEIGHT: 175 LBS | HEIGHT: 68 IN | OXYGEN SATURATION: 98 % | HEART RATE: 80 BPM | RESPIRATION RATE: 18 BRPM | BODY MASS INDEX: 26.52 KG/M2

## 2024-07-03 DIAGNOSIS — J01.00 ACUTE NON-RECURRENT MAXILLARY SINUSITIS: Primary | ICD-10-CM

## 2024-07-03 DIAGNOSIS — H69.93 DYSFUNCTION OF BOTH EUSTACHIAN TUBES: ICD-10-CM

## 2024-07-03 PROCEDURE — 96372 THER/PROPH/DIAG INJ SC/IM: CPT | Performed by: NURSE PRACTITIONER

## 2024-07-03 PROCEDURE — 99213 OFFICE O/P EST LOW 20 MIN: CPT | Performed by: NURSE PRACTITIONER

## 2024-07-03 RX ORDER — TRIAMCINOLONE ACETONIDE 40 MG/ML
40 INJECTION, SUSPENSION INTRA-ARTICULAR; INTRAMUSCULAR ONCE
Status: COMPLETED | OUTPATIENT
Start: 2024-07-03 | End: 2024-07-03

## 2024-07-03 RX ORDER — AMOXICILLIN 500 MG/1
1000 CAPSULE ORAL 2 TIMES DAILY
Qty: 28 CAPSULE | Refills: 0 | Status: SHIPPED | OUTPATIENT
Start: 2024-07-03

## 2024-07-03 RX ADMIN — TRIAMCINOLONE ACETONIDE 40 MG: 40 INJECTION, SUSPENSION INTRA-ARTICULAR; INTRAMUSCULAR at 10:25
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
## 2024-08-27 ENCOUNTER — OFFICE VISIT (OUTPATIENT)
Dept: FAMILY MEDICINE CLINIC | Facility: CLINIC | Age: 54
End: 2024-08-27
Payer: COMMERCIAL

## 2024-08-27 VITALS
OXYGEN SATURATION: 97 % | BODY MASS INDEX: 26.52 KG/M2 | HEIGHT: 68 IN | DIASTOLIC BLOOD PRESSURE: 83 MMHG | SYSTOLIC BLOOD PRESSURE: 137 MMHG | WEIGHT: 175 LBS | HEART RATE: 88 BPM | RESPIRATION RATE: 16 BRPM | TEMPERATURE: 98.6 F

## 2024-08-27 DIAGNOSIS — J01.00 ACUTE NON-RECURRENT MAXILLARY SINUSITIS: Primary | ICD-10-CM

## 2024-08-27 PROCEDURE — 87428 SARSCOV & INF VIR A&B AG IA: CPT | Performed by: FAMILY MEDICINE

## 2024-08-27 PROCEDURE — 99213 OFFICE O/P EST LOW 20 MIN: CPT | Performed by: FAMILY MEDICINE

## 2024-08-27 PROCEDURE — 96372 THER/PROPH/DIAG INJ SC/IM: CPT | Performed by: FAMILY MEDICINE

## 2024-08-27 RX ORDER — TRIAMCINOLONE ACETONIDE 40 MG/ML
40 INJECTION, SUSPENSION INTRA-ARTICULAR; INTRAMUSCULAR ONCE
Status: COMPLETED | OUTPATIENT
Start: 2024-08-27 | End: 2024-08-27

## 2024-08-27 RX ORDER — AZITHROMYCIN 250 MG/1
TABLET, FILM COATED ORAL
Qty: 6 TABLET | Refills: 0 | Status: SHIPPED | OUTPATIENT
Start: 2024-08-27

## 2024-08-27 RX ADMIN — TRIAMCINOLONE ACETONIDE 40 MG: 40 INJECTION, SUSPENSION INTRA-ARTICULAR; INTRAMUSCULAR at 14:15

## 2024-08-27 NOTE — PROGRESS NOTES
Subjective   Rylie Salazar is a 54 y.o. female.     History of Present Illness  54-year-old female with sinus congestion cough      The following portions of the patient's history were reviewed and updated as appropriate: allergies, current medications, past family history, past medical history, past social history, past surgical history, and problem list.    Review of Systems   Constitutional:  Positive for fatigue.   HENT:  Positive for ear pain, hearing loss and sinus pressure.    Respiratory:  Positive for cough. Negative for shortness of breath.    Cardiovascular:  Negative for chest pain and leg swelling.       Objective   Physical Exam  Vitals and nursing note reviewed.   Constitutional:       Appearance: Normal appearance.   HENT:      Right Ear: Tympanic membrane and ear canal normal.      Left Ear: Ear canal normal.      Ears:      Comments: Serous otitis on left     Mouth/Throat:      Mouth: Mucous membranes are moist.      Pharynx: Oropharynx is clear.   Cardiovascular:      Rate and Rhythm: Normal rate and regular rhythm.   Pulmonary:      Effort: Pulmonary effort is normal.      Breath sounds: Normal breath sounds. No wheezing or rhonchi.   Musculoskeletal:      Right lower leg: No edema.      Left lower leg: No edema.   Skin:     General: Skin is warm and dry.   Neurological:      General: No focal deficit present.      Mental Status: She is alert and oriented to person, place, and time.   Psychiatric:         Mood and Affect: Mood normal.         Behavior: Behavior normal.         Thought Content: Thought content normal.         Judgment: Judgment normal.         Assessment & Plan   Diagnoses and all orders for this visit:    1. Acute non-recurrent maxillary sinusitis (Primary)  -     triamcinolone acetonide (KENALOG-40) injection 40 mg  -     POCT SARS-CoV-2 + Flu Antigen YASIR    Other orders  -     azithromycin (Zithromax Z-Rashaad) 250 MG tablet; Take 2 tablets the first day, then 1 tablet daily for 4  days.  Start today  Dispense: 6 tablet; Refill: 0       Plan above plus Zyrtec and Flonase    COVID and flu negative

## 2024-09-09 RX ORDER — ONDANSETRON 8 MG/1
8 TABLET, FILM COATED ORAL EVERY 8 HOURS PRN
Qty: 30 TABLET | Refills: 0 | Status: SHIPPED | OUTPATIENT
Start: 2024-09-09

## 2024-09-09 NOTE — TELEPHONE ENCOUNTER
I am needing a refill for an anti-nausea medication. I use it when wegovy makes me nauseous and when we go on cruises for motion sickness.   Ondansetron ODT 8 mg tablets work great.      I meant to ask last time I was in office but forgot. [facility] [place] will be great.   Thank you   Rylie Salazar    Rx Refill Note  Requested Prescriptions      No prescriptions requested or ordered in this encounter      Last office visit with prescribing clinician: 8/27/2024   Last telemedicine visit with prescribing clinician: Visit date not found   Next office visit with prescribing clinician:    Ruthann Shin MA  09/09/24, 07:30 CDT

## 2024-09-25 ENCOUNTER — OFFICE VISIT (OUTPATIENT)
Dept: FAMILY MEDICINE CLINIC | Facility: CLINIC | Age: 54
End: 2024-09-25
Payer: COMMERCIAL

## 2024-09-25 VITALS
HEART RATE: 78 BPM | HEIGHT: 68 IN | BODY MASS INDEX: 26.49 KG/M2 | OXYGEN SATURATION: 95 % | SYSTOLIC BLOOD PRESSURE: 128 MMHG | TEMPERATURE: 98 F | DIASTOLIC BLOOD PRESSURE: 76 MMHG | WEIGHT: 174.8 LBS | RESPIRATION RATE: 16 BRPM

## 2024-09-25 DIAGNOSIS — J01.91 ACUTE RECURRENT SINUSITIS, UNSPECIFIED LOCATION: ICD-10-CM

## 2024-09-25 DIAGNOSIS — J02.9 SORE THROAT: ICD-10-CM

## 2024-09-25 DIAGNOSIS — R05.1 ACUTE COUGH: Primary | ICD-10-CM

## 2024-09-25 PROCEDURE — 87880 STREP A ASSAY W/OPTIC: CPT | Performed by: FAMILY MEDICINE

## 2024-09-25 PROCEDURE — 96372 THER/PROPH/DIAG INJ SC/IM: CPT | Performed by: FAMILY MEDICINE

## 2024-09-25 PROCEDURE — 87428 SARSCOV & INF VIR A&B AG IA: CPT | Performed by: FAMILY MEDICINE

## 2024-09-25 PROCEDURE — 99213 OFFICE O/P EST LOW 20 MIN: CPT | Performed by: FAMILY MEDICINE

## 2024-09-25 RX ORDER — TRIAMCINOLONE ACETONIDE 40 MG/ML
40 INJECTION, SUSPENSION INTRA-ARTICULAR; INTRAMUSCULAR ONCE
Status: COMPLETED | OUTPATIENT
Start: 2024-09-25 | End: 2024-09-25

## 2024-09-25 RX ORDER — AZITHROMYCIN 250 MG/1
TABLET, FILM COATED ORAL
Qty: 6 TABLET | Refills: 0 | Status: SHIPPED | OUTPATIENT
Start: 2024-09-25

## 2024-09-25 RX ADMIN — TRIAMCINOLONE ACETONIDE 40 MG: 40 INJECTION, SUSPENSION INTRA-ARTICULAR; INTRAMUSCULAR at 15:36

## 2024-10-11 RX ORDER — SEMAGLUTIDE 2.4 MG/.75ML
INJECTION, SOLUTION SUBCUTANEOUS
Qty: 9 ML | Refills: 3 | Status: SHIPPED | OUTPATIENT
Start: 2024-10-11

## 2024-10-11 NOTE — TELEPHONE ENCOUNTER
Rx Refill Note  Requested Prescriptions     Pending Prescriptions Disp Refills    Wegovy 2.4 MG/0.75ML solution auto-injector [Pharmacy Med Name: WEGOVY PEN INJ 0.75ML 4'S 2.4MG] 9 mL 3     Sig: INJECT 2.4 MG UNDER THE SKIN INTO THE APPROPRIATE AREA ONCE PER WEEK AS DIRECTED      Last office visit with prescribing clinician: 9/25/2024   Last telemedicine visit with prescribing clinician: Visit date not found   Next office visit with prescribing clinician:     Ruthann Shin MA  10/11/24, 07:48 CDT

## 2024-11-01 ENCOUNTER — OFFICE VISIT (OUTPATIENT)
Dept: FAMILY MEDICINE CLINIC | Facility: CLINIC | Age: 54
End: 2024-11-01
Payer: COMMERCIAL

## 2024-11-01 VITALS
HEART RATE: 81 BPM | DIASTOLIC BLOOD PRESSURE: 84 MMHG | TEMPERATURE: 98 F | BODY MASS INDEX: 26.67 KG/M2 | WEIGHT: 176 LBS | OXYGEN SATURATION: 99 % | SYSTOLIC BLOOD PRESSURE: 126 MMHG | HEIGHT: 68 IN

## 2024-11-01 DIAGNOSIS — R20.2 PARESTHESIAS: Primary | ICD-10-CM

## 2024-11-01 PROCEDURE — 99213 OFFICE O/P EST LOW 20 MIN: CPT | Performed by: NURSE PRACTITIONER
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
## 2024-11-05 LAB
ALBUMIN SERPL-MCNC: 4.2 G/DL (ref 3.5–5.2)
ALBUMIN/GLOB SERPL: 1.8 G/DL
ALP SERPL-CCNC: 80 U/L (ref 39–117)
ALT SERPL-CCNC: 24 U/L (ref 1–33)
ANA SER QL IF: NEGATIVE
AST SERPL-CCNC: 21 U/L (ref 1–32)
BASOPHILS # BLD AUTO: 0.06 10*3/MM3 (ref 0–0.2)
BASOPHILS NFR BLD AUTO: 0.8 % (ref 0–1.5)
BILIRUB SERPL-MCNC: 0.5 MG/DL (ref 0–1.2)
BUN SERPL-MCNC: 13 MG/DL (ref 6–20)
BUN/CREAT SERPL: 13.8 (ref 7–25)
CALCIUM SERPL-MCNC: 9.3 MG/DL (ref 8.6–10.5)
CHLORIDE SERPL-SCNC: 103 MMOL/L (ref 98–107)
CO2 SERPL-SCNC: 30 MMOL/L (ref 22–29)
CREAT SERPL-MCNC: 0.94 MG/DL (ref 0.57–1)
CRP SERPL-MCNC: <0.3 MG/DL (ref 0–0.5)
EGFRCR SERPLBLD CKD-EPI 2021: 71.8 ML/MIN/1.73
EOSINOPHIL # BLD AUTO: 0.05 10*3/MM3 (ref 0–0.4)
EOSINOPHIL NFR BLD AUTO: 0.7 % (ref 0.3–6.2)
ERYTHROCYTE [DISTWIDTH] IN BLOOD BY AUTOMATED COUNT: 12.8 % (ref 12.3–15.4)
ERYTHROCYTE [SEDIMENTATION RATE] IN BLOOD BY WESTERGREN METHOD: <1 MM/HR (ref 0–30)
FOLATE SERPL-MCNC: 5.17 NG/ML (ref 4.78–24.2)
GLOBULIN SER CALC-MCNC: 2.3 GM/DL
GLUCOSE SERPL-MCNC: 85 MG/DL (ref 65–99)
HCT VFR BLD AUTO: 44.6 % (ref 34–46.6)
HGB BLD-MCNC: 14.2 G/DL (ref 12–15.9)
IMM GRANULOCYTES # BLD AUTO: 0.03 10*3/MM3 (ref 0–0.05)
IMM GRANULOCYTES NFR BLD AUTO: 0.4 % (ref 0–0.5)
INTACT PTH: NORMAL
LABORATORY COMMENT REPORT: NORMAL
LYMPHOCYTES # BLD AUTO: 1.86 10*3/MM3 (ref 0.7–3.1)
LYMPHOCYTES NFR BLD AUTO: 24.5 % (ref 19.6–45.3)
MAGNESIUM SERPL-MCNC: 2.3 MG/DL (ref 1.6–2.6)
MCH RBC QN AUTO: 30 PG (ref 26.6–33)
MCHC RBC AUTO-ENTMCNC: 31.8 G/DL (ref 31.5–35.7)
MCV RBC AUTO: 94.3 FL (ref 79–97)
MONOCYTES # BLD AUTO: 0.59 10*3/MM3 (ref 0.1–0.9)
MONOCYTES NFR BLD AUTO: 7.8 % (ref 5–12)
NEUTROPHILS # BLD AUTO: 5 10*3/MM3 (ref 1.7–7)
NEUTROPHILS NFR BLD AUTO: 65.8 % (ref 42.7–76)
NRBC BLD AUTO-RTO: 0 /100 WBC (ref 0–0.2)
PLATELET # BLD AUTO: 364 10*3/MM3 (ref 140–450)
POTASSIUM SERPL-SCNC: 4.2 MMOL/L (ref 3.5–5.2)
PROT SERPL-MCNC: 6.5 G/DL (ref 6–8.5)
PTH-INTACT SERPL-MCNC: 52 PG/ML (ref 15–65)
RBC # BLD AUTO: 4.73 10*6/MM3 (ref 3.77–5.28)
SODIUM SERPL-SCNC: 142 MMOL/L (ref 136–145)
VIT B12 SERPL-MCNC: 151 PG/ML (ref 211–946)
WBC # BLD AUTO: 7.59 10*3/MM3 (ref 3.4–10.8)

## 2024-11-07 ENCOUNTER — CLINICAL SUPPORT (OUTPATIENT)
Dept: FAMILY MEDICINE CLINIC | Facility: CLINIC | Age: 54
End: 2024-11-07
Payer: COMMERCIAL

## 2024-11-07 DIAGNOSIS — E53.8 B12 DEFICIENCY: Primary | ICD-10-CM

## 2024-11-07 PROCEDURE — 96372 THER/PROPH/DIAG INJ SC/IM: CPT | Performed by: NURSE PRACTITIONER

## 2024-11-07 RX ORDER — CYANOCOBALAMIN 1000 UG/ML
1000 INJECTION, SOLUTION INTRAMUSCULAR; SUBCUTANEOUS WEEKLY
Status: SHIPPED | OUTPATIENT
Start: 2024-11-07 | End: 2024-12-05

## 2024-11-07 RX ADMIN — CYANOCOBALAMIN 1000 MCG: 1000 INJECTION, SOLUTION INTRAMUSCULAR; SUBCUTANEOUS at 15:05

## 2024-11-14 ENCOUNTER — OFFICE VISIT (OUTPATIENT)
Dept: FAMILY MEDICINE CLINIC | Facility: CLINIC | Age: 54
End: 2024-11-14
Payer: COMMERCIAL

## 2024-11-14 VITALS
DIASTOLIC BLOOD PRESSURE: 80 MMHG | HEIGHT: 68 IN | OXYGEN SATURATION: 98 % | BODY MASS INDEX: 26.93 KG/M2 | SYSTOLIC BLOOD PRESSURE: 128 MMHG | RESPIRATION RATE: 16 BRPM | HEART RATE: 97 BPM | TEMPERATURE: 97.8 F | WEIGHT: 177.7 LBS

## 2024-11-14 DIAGNOSIS — E53.8 B12 DEFICIENCY: ICD-10-CM

## 2024-11-14 DIAGNOSIS — H10.9 CONJUNCTIVITIS OF BOTH EYES, UNSPECIFIED CONJUNCTIVITIS TYPE: Primary | ICD-10-CM

## 2024-11-14 RX ORDER — FOLIC ACID 1 MG/1
1 TABLET ORAL DAILY
Qty: 90 TABLET | Refills: 3 | Status: SHIPPED | OUTPATIENT
Start: 2024-11-14

## 2024-11-14 RX ORDER — POLYMYXIN B SULFATE AND TRIMETHOPRIM 1; 10000 MG/ML; [USP'U]/ML
1 SOLUTION OPHTHALMIC EVERY 6 HOURS
Qty: 10 ML | Refills: 1 | Status: SHIPPED | OUTPATIENT
Start: 2024-11-14

## 2024-11-14 RX ADMIN — CYANOCOBALAMIN 1000 MCG: 1000 INJECTION, SOLUTION INTRAMUSCULAR; SUBCUTANEOUS at 09:00

## 2024-11-14 NOTE — PROGRESS NOTES
Subjective   Rylie Salazar is a 54 y.o. female.     History of Present Illness  54-year-old with known B12 deficiency and borderline folic acid deficiency complains of back pain and conjunctival irritation      The following portions of the patient's history were reviewed and updated as appropriate: allergies, current medications, past family history, past medical history, past social history, past surgical history, and problem list.    Review of Systems   HENT: Negative.     Eyes:  Positive for pain, discharge and redness.   Respiratory:  Negative for cough.        Objective   Physical Exam  Vitals and nursing note reviewed.   Constitutional:       Appearance: Normal appearance.   Eyes:      General: No scleral icterus.        Right eye: Discharge present.         Left eye: Discharge present.     Extraocular Movements: Extraocular movements intact.      Pupils: Pupils are equal, round, and reactive to light.   Pulmonary:      Effort: Pulmonary effort is normal.   Neurological:      General: No focal deficit present.      Mental Status: She is alert and oriented to person, place, and time.   Psychiatric:         Mood and Affect: Mood normal.         Behavior: Behavior normal.         Thought Content: Thought content normal.         Judgment: Judgment normal.         Assessment & Plan   Diagnoses and all orders for this visit:    1. Conjunctivitis of both eyes, unspecified conjunctivitis type (Primary)    Other orders  -     trimethoprim-polymyxin b (POLYTRIM) 38014-2.1 UNIT/ML-% ophthalmic solution; Administer 1 drop to both eyes Every 6 (Six) Hours.  Dispense: 10 mL; Refill: 1  -     folic acid (FOLVITE) 1 MG tablet; Take 1 tablet by mouth Daily.  Dispense: 90 tablet; Refill: 3       Plan above plus B12 injection

## 2024-11-22 ENCOUNTER — OFFICE VISIT (OUTPATIENT)
Dept: FAMILY MEDICINE CLINIC | Facility: CLINIC | Age: 54
End: 2024-11-22
Payer: COMMERCIAL

## 2024-11-22 VITALS
HEIGHT: 68 IN | WEIGHT: 173.2 LBS | DIASTOLIC BLOOD PRESSURE: 85 MMHG | HEART RATE: 96 BPM | SYSTOLIC BLOOD PRESSURE: 133 MMHG | BODY MASS INDEX: 26.25 KG/M2 | OXYGEN SATURATION: 98 % | TEMPERATURE: 98.4 F

## 2024-11-22 DIAGNOSIS — Z01.419 WELL WOMAN EXAM WITH ROUTINE GYNECOLOGICAL EXAM: Primary | ICD-10-CM

## 2024-11-22 DIAGNOSIS — Z13.220 SCREENING, LIPID: ICD-10-CM

## 2024-11-22 DIAGNOSIS — E53.8 B12 DEFICIENCY: ICD-10-CM

## 2024-11-22 DIAGNOSIS — F33.0 MILD EPISODE OF RECURRENT MAJOR DEPRESSIVE DISORDER: ICD-10-CM

## 2024-11-22 RX ORDER — BUPROPION HYDROCHLORIDE 300 MG/1
300 TABLET ORAL DAILY
Qty: 90 TABLET | Refills: 0 | Status: SHIPPED | OUTPATIENT
Start: 2024-11-22

## 2024-11-22 RX ADMIN — CYANOCOBALAMIN 1000 MCG: 1000 INJECTION, SOLUTION INTRAMUSCULAR; SUBCUTANEOUS at 08:41
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
## 2024-11-23 LAB
CHOLEST SERPL-MCNC: 176 MG/DL (ref 0–200)
HDLC SERPL-MCNC: 55 MG/DL (ref 40–60)
LDLC SERPL CALC-MCNC: 110 MG/DL (ref 0–100)
TRIGL SERPL-MCNC: 59 MG/DL (ref 0–150)
TSH SERPL DL<=0.005 MIU/L-ACNC: 0.91 UIU/ML (ref 0.27–4.2)
VIT B12 SERPL-MCNC: 435 PG/ML (ref 211–946)
VLDLC SERPL CALC-MCNC: 11 MG/DL (ref 5–40)

## 2024-11-25 ENCOUNTER — PRIOR AUTHORIZATION (OUTPATIENT)
Dept: FAMILY MEDICINE CLINIC | Facility: CLINIC | Age: 54
End: 2024-11-25
Payer: COMMERCIAL

## 2024-11-25 ENCOUNTER — PATIENT MESSAGE (OUTPATIENT)
Dept: FAMILY MEDICINE CLINIC | Facility: CLINIC | Age: 54
End: 2024-11-25
Payer: COMMERCIAL

## 2024-11-25 ENCOUNTER — OFFICE VISIT (OUTPATIENT)
Dept: FAMILY MEDICINE CLINIC | Facility: CLINIC | Age: 54
End: 2024-11-25
Payer: COMMERCIAL

## 2024-11-25 VITALS
BODY MASS INDEX: 25.91 KG/M2 | HEIGHT: 68 IN | TEMPERATURE: 98 F | WEIGHT: 171 LBS | SYSTOLIC BLOOD PRESSURE: 132 MMHG | DIASTOLIC BLOOD PRESSURE: 84 MMHG | HEART RATE: 105 BPM | RESPIRATION RATE: 18 BRPM | OXYGEN SATURATION: 95 %

## 2024-11-25 DIAGNOSIS — F33.0 MILD EPISODE OF RECURRENT MAJOR DEPRESSIVE DISORDER: ICD-10-CM

## 2024-11-25 DIAGNOSIS — H66.92 LEFT ACUTE OTITIS MEDIA: ICD-10-CM

## 2024-11-25 DIAGNOSIS — H60.502 ACUTE OTITIS EXTERNA OF LEFT EAR, UNSPECIFIED TYPE: ICD-10-CM

## 2024-11-25 DIAGNOSIS — J01.40 ACUTE NON-RECURRENT PANSINUSITIS: Primary | ICD-10-CM

## 2024-11-25 PROCEDURE — 96372 THER/PROPH/DIAG INJ SC/IM: CPT | Performed by: NURSE PRACTITIONER

## 2024-11-25 PROCEDURE — 99213 OFFICE O/P EST LOW 20 MIN: CPT | Performed by: NURSE PRACTITIONER

## 2024-11-25 RX ORDER — FLUCONAZOLE 150 MG/1
TABLET ORAL
Qty: 2 TABLET | Refills: 0 | Status: SHIPPED | OUTPATIENT
Start: 2024-11-25

## 2024-11-25 RX ORDER — OFLOXACIN 3 MG/ML
5 SOLUTION AURICULAR (OTIC) 2 TIMES DAILY
Qty: 5 ML | Refills: 0 | Status: SHIPPED | OUTPATIENT
Start: 2024-11-25 | End: 2024-12-02

## 2024-11-25 RX ORDER — TRIAMCINOLONE ACETONIDE 40 MG/ML
40 INJECTION, SUSPENSION INTRA-ARTICULAR; INTRAMUSCULAR ONCE
Status: COMPLETED | OUTPATIENT
Start: 2024-11-25 | End: 2024-11-25

## 2024-11-25 RX ADMIN — TRIAMCINOLONE ACETONIDE 40 MG: 40 INJECTION, SUSPENSION INTRA-ARTICULAR; INTRAMUSCULAR at 13:45
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
## 2024-11-25 NOTE — LETTER
November 25, 2024     Patient: Rylie Salazar   YOB: 1970   Date of Visit: 11/25/2024       To Whom It May Concern:    It is my medical opinion that Rylie Salazar may return to work on 11/27/2024 .           Sincerely,        LEONCIO Tsai/mariaelena

## 2024-11-25 NOTE — TELEPHONE ENCOUNTER
Rx Refill Note  Requested Prescriptions     Pending Prescriptions Disp Refills    folic acid (FOLVITE) 1 MG tablet 90 tablet 3     Sig: Take 1 tablet by mouth Daily.    buPROPion XL (Wellbutrin XL) 300 MG 24 hr tablet 90 tablet 3     Sig: Take 1 tablet by mouth Daily.      Last office visit with prescribing clinician: 11/14/2024   Last telemedicine visit with prescribing clinician: Visit date not found   Next office visit with prescribing clinician: Visit date not found                         Would you like a call back once the refill request has been completed: [] Yes [] No    If the office needs to give you a call back, can they leave a voicemail: [] Yes [] No    Tano Urban Rep  11/25/24, 16:26 CST

## 2024-11-25 NOTE — TELEPHONE ENCOUNTER
Your request has been approved  CaseId:[id number];Status:Approved;Review Type:Prior Auth;Coverage Start Date:10/26/2024;Coverage End Date:11/25/2025;

## 2024-11-26 DIAGNOSIS — J32.9 CHRONIC SINUSITIS, UNSPECIFIED LOCATION: Primary | ICD-10-CM

## 2024-11-26 RX ORDER — BUPROPION HYDROCHLORIDE 300 MG/1
300 TABLET ORAL DAILY
Qty: 90 TABLET | Refills: 3 | Status: SHIPPED | OUTPATIENT
Start: 2024-11-26

## 2024-11-26 RX ORDER — FOLIC ACID 1 MG/1
1 TABLET ORAL DAILY
Qty: 90 TABLET | Refills: 3 | Status: SHIPPED | OUTPATIENT
Start: 2024-11-26

## 2024-12-03 ENCOUNTER — CLINICAL SUPPORT (OUTPATIENT)
Dept: FAMILY MEDICINE CLINIC | Facility: CLINIC | Age: 54
End: 2024-12-03
Payer: COMMERCIAL

## 2024-12-03 ENCOUNTER — PATIENT MESSAGE (OUTPATIENT)
Dept: FAMILY MEDICINE CLINIC | Facility: CLINIC | Age: 54
End: 2024-12-03
Payer: COMMERCIAL

## 2024-12-03 DIAGNOSIS — E53.8 B12 DEFICIENCY: Primary | ICD-10-CM

## 2024-12-03 PROCEDURE — 96372 THER/PROPH/DIAG INJ SC/IM: CPT | Performed by: FAMILY MEDICINE

## 2024-12-03 RX ORDER — PREDNISONE 10 MG/1
TABLET ORAL
Qty: 21 EACH | Refills: 0 | Status: SHIPPED | OUTPATIENT
Start: 2024-12-03

## 2024-12-03 RX ORDER — OFLOXACIN 3 MG/ML
SOLUTION AURICULAR (OTIC)
Qty: 5 ML | Refills: 0 | OUTPATIENT
Start: 2024-12-03

## 2024-12-03 RX ADMIN — CYANOCOBALAMIN 1000 MCG: 1000 INJECTION, SOLUTION INTRAMUSCULAR; SUBCUTANEOUS at 12:56

## 2024-12-03 NOTE — TELEPHONE ENCOUNTER
Rx Refill Note  Requested Prescriptions     Pending Prescriptions Disp Refills    ofloxacin (FLOXIN) 0.3 % otic solution [Pharmacy Med Name: OFLOXACIN 0.3% EAR DROPS] 5 mL 0     Sig: ADMINISTER 5 DROPS INTO THE LEFT EAR 2 TIMES A DAY FOR 7 DAYS.      Last office visit with prescribing clinician: 11/25/2024   Last telemedicine visit with prescribing clinician: Visit date not found   Next office visit with prescribing clinician: 1/3/2025                         Would you like a call back once the refill request has been completed: [] Yes [] No    If the office needs to give you a call back, can they leave a voicemail: [] Yes [] No    Ruthann Silva MA  12/03/24, 16:06 CST

## 2024-12-11 ENCOUNTER — HOSPITAL ENCOUNTER (OUTPATIENT)
Dept: CT IMAGING | Facility: HOSPITAL | Age: 54
Discharge: HOME | End: 2024-12-11
Admitting: FAMILY MEDICINE
Payer: COMMERCIAL

## 2024-12-11 DIAGNOSIS — J32.9 CHRONIC SINUSITIS, UNSPECIFIED LOCATION: ICD-10-CM

## 2024-12-11 PROCEDURE — 70486 CT MAXILLOFACIAL W/O DYE: CPT

## 2024-12-12 ENCOUNTER — OFFICE VISIT (OUTPATIENT)
Dept: FAMILY MEDICINE CLINIC | Facility: CLINIC | Age: 54
End: 2024-12-12
Payer: COMMERCIAL

## 2024-12-12 VITALS
TEMPERATURE: 98 F | DIASTOLIC BLOOD PRESSURE: 87 MMHG | BODY MASS INDEX: 26.07 KG/M2 | SYSTOLIC BLOOD PRESSURE: 124 MMHG | OXYGEN SATURATION: 98 % | HEART RATE: 116 BPM | HEIGHT: 68 IN | WEIGHT: 172 LBS

## 2024-12-12 DIAGNOSIS — H69.92 DYSFUNCTION OF LEFT EUSTACHIAN TUBE: Primary | ICD-10-CM

## 2024-12-12 PROCEDURE — 99213 OFFICE O/P EST LOW 20 MIN: CPT | Performed by: NURSE PRACTITIONER

## 2024-12-12 PROCEDURE — 96372 THER/PROPH/DIAG INJ SC/IM: CPT | Performed by: NURSE PRACTITIONER

## 2024-12-12 RX ORDER — TRIAMCINOLONE ACETONIDE 40 MG/ML
40 INJECTION, SUSPENSION INTRA-ARTICULAR; INTRAMUSCULAR ONCE
Status: COMPLETED | OUTPATIENT
Start: 2024-12-12 | End: 2024-12-12

## 2024-12-12 RX ORDER — CEFDINIR 300 MG/1
300 CAPSULE ORAL 2 TIMES DAILY
Qty: 14 CAPSULE | Refills: 0 | Status: SHIPPED | OUTPATIENT
Start: 2024-12-12

## 2024-12-12 RX ADMIN — TRIAMCINOLONE ACETONIDE 40 MG: 40 INJECTION, SUSPENSION INTRA-ARTICULAR; INTRAMUSCULAR at 15:42
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
## 2024-12-13 ENCOUNTER — OFFICE VISIT (OUTPATIENT)
Dept: OTOLARYNGOLOGY | Facility: CLINIC | Age: 54
End: 2024-12-13
Payer: COMMERCIAL

## 2024-12-13 VITALS
HEIGHT: 68 IN | TEMPERATURE: 97.8 F | WEIGHT: 173.5 LBS | SYSTOLIC BLOOD PRESSURE: 147 MMHG | BODY MASS INDEX: 26.3 KG/M2 | HEART RATE: 94 BPM | DIASTOLIC BLOOD PRESSURE: 77 MMHG

## 2024-12-13 DIAGNOSIS — H72.92 TYMPANIC MEMBRANE PERFORATION, LEFT: Primary | ICD-10-CM

## 2024-12-13 RX ORDER — CIPROFLOXACIN AND DEXAMETHASONE 3; 1 MG/ML; MG/ML
4 SUSPENSION/ DROPS AURICULAR (OTIC) 2 TIMES DAILY
Qty: 7.5 ML | Refills: 0 | Status: SHIPPED | OUTPATIENT
Start: 2024-12-13
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
## 2024-12-18 ENCOUNTER — OFFICE VISIT (OUTPATIENT)
Dept: OTOLARYNGOLOGY | Facility: CLINIC | Age: 54
End: 2024-12-18
Payer: COMMERCIAL

## 2024-12-18 VITALS
WEIGHT: 174.6 LBS | OXYGEN SATURATION: 99 % | DIASTOLIC BLOOD PRESSURE: 80 MMHG | RESPIRATION RATE: 20 BRPM | HEIGHT: 68 IN | BODY MASS INDEX: 26.46 KG/M2 | SYSTOLIC BLOOD PRESSURE: 140 MMHG | TEMPERATURE: 97.5 F | HEART RATE: 91 BPM

## 2024-12-18 DIAGNOSIS — H72.92 TYMPANIC MEMBRANE PERFORATION, LEFT: Primary | ICD-10-CM

## 2024-12-18 DIAGNOSIS — H93.8X2 EAR PRESSURE, LEFT: ICD-10-CM
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
## 2025-02-12 ENCOUNTER — OFFICE VISIT (OUTPATIENT)
Dept: OTOLARYNGOLOGY | Facility: CLINIC | Age: 55
End: 2025-02-12
Payer: COMMERCIAL

## 2025-02-12 VITALS
HEART RATE: 81 BPM | HEIGHT: 68 IN | BODY MASS INDEX: 26.83 KG/M2 | SYSTOLIC BLOOD PRESSURE: 142 MMHG | WEIGHT: 177 LBS | RESPIRATION RATE: 18 BRPM | DIASTOLIC BLOOD PRESSURE: 82 MMHG | TEMPERATURE: 97.3 F

## 2025-02-12 DIAGNOSIS — J30.2 SEASONAL ALLERGIES: ICD-10-CM

## 2025-02-12 DIAGNOSIS — H72.92 TYMPANIC MEMBRANE PERFORATION, LEFT: Primary | ICD-10-CM

## 2025-02-12 DIAGNOSIS — H93.8X2 EAR PRESSURE, LEFT: ICD-10-CM

## 2025-02-12 DIAGNOSIS — H69.92 ETD (EUSTACHIAN TUBE DYSFUNCTION), LEFT: ICD-10-CM

## 2025-02-12 DIAGNOSIS — J31.0 CHRONIC RHINITIS: ICD-10-CM

## 2025-02-12 RX ORDER — AZELASTINE 1 MG/ML
2 SPRAY, METERED NASAL 2 TIMES DAILY
Qty: 30 ML | Refills: 2 | Status: SHIPPED | OUTPATIENT
Start: 2025-02-12

## 2025-02-12 RX ORDER — FLUTICASONE PROPIONATE 50 MCG
2 SPRAY, SUSPENSION (ML) NASAL DAILY
Qty: 16 G | Refills: 2 | Status: SHIPPED | OUTPATIENT
Start: 2025-02-12

## 2025-02-12 NOTE — PATIENT INSTRUCTIONS
>NASAL STEROID use:  Using nasal steroids:  You will be prescribed one of the following nasal steroids: Flonase, Nasacort, Nasonex, Rhinocort, Qnasl, Zetonna  2 puffs each nostril 2 times daily  Start as soon as possible  If you are using Afrin for 3 days with the nasal steroid,  Use Afrin first and wait 10 minutes to allow the nose to open. Then administer nasal steroids.     >NASAL SALINE:  Use 2 puffs each nostril 4-6 times daily and more frequently if possible.  You can buy saline spray or you can make your own and use an old spray bottle to administer  Use a humidifier at bedside  Recipe for saline:  Water                                 1 quart  Salt (table)                        1 tablespoon  Gylcerin (or Francesca Syrup)    1 teaspoon  Sodium bicarbonate           1 teaspoon  Sprays or Addis pots are recommended    Do not allow to stand for more than 24 hrs. Make new solution. There is no preservative in this solution.    Sinus irrigation and saline application can be enhanced with various over-the-counter products.  A WaterPik can be quite useful to irrigate, especially following sinus surgery.  Navage makes a product that irrigates the nose and some of the sinuses.  NeilMed makes a Sinu-Gator to irrigate the sinuses.  Neomed also has canned saline that we will come out under pressure.  A Addis pot can also be helpful.  All of these products help keep the nose clear of debris.  Please use as directed on the instructions that come with the particular device.     How to pop ears:  Pop your ears by holding nose and closing mouth, then blow hard until ears pop  Children (less than 8-9 years)- blow up ballons 4 times a day and more frequently
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
## 2025-02-20 DIAGNOSIS — F33.0 MILD EPISODE OF RECURRENT MAJOR DEPRESSIVE DISORDER: ICD-10-CM

## 2025-02-20 RX ORDER — BUPROPION HYDROCHLORIDE 300 MG/1
300 TABLET ORAL DAILY
Qty: 90 TABLET | Refills: 3 | Status: SHIPPED | OUTPATIENT
Start: 2025-02-20

## 2025-02-20 NOTE — TELEPHONE ENCOUNTER
Rx Refill Note  Requested Prescriptions     Pending Prescriptions Disp Refills    buPROPion XL (WELLBUTRIN XL) 300 MG 24 hr tablet [Pharmacy Med Name: BUPROPION HCL  MG TABLET] 90 tablet 3     Sig: TAKE 1 TABLET BY MOUTH EVERY DAY      Last office visit with prescribing clinician: 11/25/2024   Last telemedicine visit with prescribing clinician: Visit date not found   Next office visit with prescribing clinician: Visit date not found   CPE done 11/22/2024                  {TIP  Is Refill Pharmacy correct?: yes    Would you like a call back once the refill request has been completed: [] Yes [] No    If the office needs to give you a call back, can they leave a voicemail: [] Yes [] No    Ruthann Silva MA  02/20/25, 10:36 CST

## 2025-03-02 ENCOUNTER — PATIENT MESSAGE (OUTPATIENT)
Dept: FAMILY MEDICINE CLINIC | Facility: CLINIC | Age: 55
End: 2025-03-02
Payer: COMMERCIAL

## 2025-03-02 DIAGNOSIS — E66.3 OVERWEIGHT: Primary | ICD-10-CM

## 2025-03-06 DIAGNOSIS — E66.3 OVERWEIGHT: Primary | ICD-10-CM

## 2025-03-07 ENCOUNTER — PRIOR AUTHORIZATION (OUTPATIENT)
Dept: FAMILY MEDICINE CLINIC | Facility: CLINIC | Age: 55
End: 2025-03-07
Payer: COMMERCIAL

## 2025-03-10 DIAGNOSIS — E66.3 OVERWEIGHT: ICD-10-CM

## 2025-03-10 NOTE — TELEPHONE ENCOUNTER
Rx Refill Note  Requested Prescriptions     Pending Prescriptions Disp Refills    Tirzepatide-Weight Management (ZEPBOUND) 2.5 MG/0.5ML solution auto-injector 6 mL 3     Sig: Inject 0.5 mL under the skin into the appropriate area as directed 1 (One) Time Per Week.      Last office visit with prescribing clinician: 11/14/2024   Last telemedicine visit with prescribing clinician: Visit date not found   Next office visit with prescribing clinician: Visit date not found                         Would you like a call back once the refill request has been completed: [] Yes [] No    If the office needs to give you a call back, can they leave a voicemail: [] Yes [] No    Ruthann Silva MA  03/10/25, 13:02 CDT

## 2025-05-13 ENCOUNTER — OFFICE VISIT (OUTPATIENT)
Dept: FAMILY MEDICINE CLINIC | Facility: CLINIC | Age: 55
End: 2025-05-13
Payer: COMMERCIAL

## 2025-05-13 VITALS
HEART RATE: 92 BPM | DIASTOLIC BLOOD PRESSURE: 83 MMHG | HEIGHT: 68 IN | SYSTOLIC BLOOD PRESSURE: 133 MMHG | WEIGHT: 175.2 LBS | TEMPERATURE: 97.8 F | OXYGEN SATURATION: 98 % | BODY MASS INDEX: 26.55 KG/M2

## 2025-05-13 DIAGNOSIS — J01.00 ACUTE MAXILLARY SINUSITIS, RECURRENCE NOT SPECIFIED: Primary | ICD-10-CM

## 2025-05-13 DIAGNOSIS — E53.8 B12 DEFICIENCY: ICD-10-CM

## 2025-05-13 RX ORDER — CYANOCOBALAMIN 1000 UG/ML
1000 INJECTION, SOLUTION INTRAMUSCULAR; SUBCUTANEOUS ONCE
Status: COMPLETED | OUTPATIENT
Start: 2025-05-13 | End: 2025-05-13

## 2025-05-13 RX ORDER — TRIAMCINOLONE ACETONIDE 40 MG/ML
40 INJECTION, SUSPENSION INTRA-ARTICULAR; INTRAMUSCULAR ONCE
Status: COMPLETED | OUTPATIENT
Start: 2025-05-13 | End: 2025-05-13

## 2025-05-13 RX ORDER — AMOXICILLIN 875 MG/1
875 TABLET, COATED ORAL 2 TIMES DAILY
Qty: 14 TABLET | Refills: 0 | Status: SHIPPED | OUTPATIENT
Start: 2025-05-13 | End: 2025-05-20

## 2025-05-13 RX ADMIN — TRIAMCINOLONE ACETONIDE 40 MG: 40 INJECTION, SUSPENSION INTRA-ARTICULAR; INTRAMUSCULAR at 13:16

## 2025-05-13 RX ADMIN — CYANOCOBALAMIN 1000 MCG: 1000 INJECTION, SOLUTION INTRAMUSCULAR; SUBCUTANEOUS at 13:16
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
## 2025-05-20 DIAGNOSIS — E66.3 OVERWEIGHT: ICD-10-CM

## 2025-05-21 NOTE — TELEPHONE ENCOUNTER
Pt occasionally yells out incomprehensibly. Pt yelled, \"it's not fair, I feel so bad. \" Rx Refill Note  Requested Prescriptions     Pending Prescriptions Disp Refills    Tirzepatide-Weight Management (ZEPBOUND) 2.5 MG/0.5ML solution auto-injector 2 mL 2     Sig: Inject 0.5 mL under the skin into the appropriate area as directed 1 (One) Time Per Week.      Last office visit with prescribing clinician: 5/13/25   Last telemedicine visit with prescribing clinician: Visit date not found   Next office visit with prescribing clinician:     Ruthann Shin MA  05/21/25, 07:10 CDT

## 2025-05-23 NOTE — TELEPHONE ENCOUNTER
Copied from my chart message:  Thank you for sending in Prescription for zepbound but [facility] needs it to be for 3 months not 1. Sorry

## 2025-06-11 ENCOUNTER — OFFICE VISIT (OUTPATIENT)
Dept: FAMILY MEDICINE CLINIC | Facility: CLINIC | Age: 55
End: 2025-06-11
Payer: COMMERCIAL

## 2025-06-11 VITALS
RESPIRATION RATE: 18 BRPM | WEIGHT: 170.2 LBS | HEIGHT: 68 IN | HEART RATE: 83 BPM | DIASTOLIC BLOOD PRESSURE: 70 MMHG | SYSTOLIC BLOOD PRESSURE: 138 MMHG | OXYGEN SATURATION: 98 % | BODY MASS INDEX: 25.79 KG/M2 | TEMPERATURE: 98.1 F

## 2025-06-11 DIAGNOSIS — E56.1 DEFICIENCY OF VITAMIN K: ICD-10-CM

## 2025-06-11 DIAGNOSIS — R53.83 OTHER FATIGUE: ICD-10-CM

## 2025-06-11 DIAGNOSIS — E53.8 B12 DEFICIENCY: Primary | ICD-10-CM

## 2025-06-11 DIAGNOSIS — D69.9 BLEEDING DIATHESIS: ICD-10-CM

## 2025-06-11 DIAGNOSIS — R52 PAIN: ICD-10-CM

## 2025-06-11 RX ORDER — GABAPENTIN 300 MG/1
CAPSULE ORAL
Qty: 30 CAPSULE | Refills: 0 | Status: SHIPPED | OUTPATIENT
Start: 2025-06-11
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
## 2025-06-14 LAB
ALBUMIN SERPL-MCNC: 4.4 G/DL (ref 3.5–5.2)
ALBUMIN/GLOB SERPL: 1.8 G/DL
ALP SERPL-CCNC: 95 U/L (ref 39–117)
ALT SERPL-CCNC: 18 U/L (ref 1–33)
APTT PPP: 30.1 SECONDS (ref 24.5–36)
AST SERPL-CCNC: 20 U/L (ref 1–32)
BASOPHILS # BLD AUTO: 0.06 10*3/MM3 (ref 0–0.2)
BASOPHILS NFR BLD AUTO: 1.2 % (ref 0–1.5)
BILIRUB SERPL-MCNC: 0.5 MG/DL (ref 0–1.2)
BUN SERPL-MCNC: 8 MG/DL (ref 6–20)
BUN/CREAT SERPL: 8.3 (ref 7–25)
CALCIUM SERPL-MCNC: 9.5 MG/DL (ref 8.6–10.5)
CHLORIDE SERPL-SCNC: 102 MMOL/L (ref 98–107)
CO2 SERPL-SCNC: 28.5 MMOL/L (ref 22–29)
CREAT SERPL-MCNC: 0.96 MG/DL (ref 0.57–1)
EGFRCR SERPLBLD CKD-EPI 2021: 69.6 ML/MIN/1.73
EOSINOPHIL # BLD AUTO: 0.09 10*3/MM3 (ref 0–0.4)
EOSINOPHIL NFR BLD AUTO: 1.7 % (ref 0.3–6.2)
ERYTHROCYTE [DISTWIDTH] IN BLOOD BY AUTOMATED COUNT: 12.9 % (ref 12.3–15.4)
FOLATE SERPL-MCNC: >20 NG/ML (ref 4.78–24.2)
GLOBULIN SER CALC-MCNC: 2.4 GM/DL
GLUCOSE SERPL-MCNC: 70 MG/DL (ref 65–99)
HCT VFR BLD AUTO: 46.5 % (ref 34–46.6)
HGB BLD-MCNC: 14.7 G/DL (ref 12–15.9)
IMM GRANULOCYTES # BLD AUTO: 0.01 10*3/MM3 (ref 0–0.05)
IMM GRANULOCYTES NFR BLD AUTO: 0.2 % (ref 0–0.5)
INR PPP: 0.9 (ref 0.91–1.09)
LYMPHOCYTES # BLD AUTO: 1.9 10*3/MM3 (ref 0.7–3.1)
LYMPHOCYTES NFR BLD AUTO: 36.5 % (ref 19.6–45.3)
MCH RBC QN AUTO: 30.3 PG (ref 26.6–33)
MCHC RBC AUTO-ENTMCNC: 31.6 G/DL (ref 31.5–35.7)
MCV RBC AUTO: 95.9 FL (ref 79–97)
MONOCYTES # BLD AUTO: 0.45 10*3/MM3 (ref 0.1–0.9)
MONOCYTES NFR BLD AUTO: 8.7 % (ref 5–12)
NEUTROPHILS # BLD AUTO: 2.69 10*3/MM3 (ref 1.7–7)
NEUTROPHILS NFR BLD AUTO: 51.7 % (ref 42.7–76)
NRBC BLD AUTO-RTO: 0 /100 WBC (ref 0–0.2)
PHYTONADIONE SERPL-MCNC: 0.1 NG/ML (ref 0.1–2.2)
PLATELET # BLD AUTO: 336 10*3/MM3 (ref 140–450)
POTASSIUM SERPL-SCNC: 3.9 MMOL/L (ref 3.5–5.2)
PROT SERPL-MCNC: 6.8 G/DL (ref 6–8.5)
PROTHROMBIN TIME: 12.6 SECONDS (ref 11.8–14.8)
RBC # BLD AUTO: 4.85 10*6/MM3 (ref 3.77–5.28)
SODIUM SERPL-SCNC: 140 MMOL/L (ref 136–145)
TSH SERPL DL<=0.005 MIU/L-ACNC: 1.69 UIU/ML (ref 0.27–4.2)
VIT B12 SERPL-MCNC: 389 PG/ML (ref 211–946)
WBC # BLD AUTO: 5.2 10*3/MM3 (ref 3.4–10.8)

## 2025-06-23 ENCOUNTER — OFFICE VISIT (OUTPATIENT)
Age: 55
End: 2025-06-23
Payer: COMMERCIAL

## 2025-06-23 VITALS — BODY MASS INDEX: 26.68 KG/M2 | HEIGHT: 67 IN | WEIGHT: 170 LBS

## 2025-06-23 DIAGNOSIS — M17.11 PRIMARY OSTEOARTHRITIS OF RIGHT KNEE: ICD-10-CM

## 2025-06-23 DIAGNOSIS — M25.561 RIGHT KNEE PAIN, UNSPECIFIED CHRONICITY: Primary | ICD-10-CM

## 2025-06-23 PROCEDURE — 20610 DRAIN/INJ JOINT/BURSA W/O US: CPT | Performed by: ORTHOPAEDIC SURGERY

## 2025-06-23 PROCEDURE — 99213 OFFICE O/P EST LOW 20 MIN: CPT | Performed by: ORTHOPAEDIC SURGERY

## 2025-06-23 RX ORDER — BUPROPION HYDROCHLORIDE 300 MG/1
TABLET ORAL
COMMUNITY
Start: 2025-04-28

## 2025-06-23 RX ORDER — NABUMETONE 500 MG/1
500 TABLET, FILM COATED ORAL 2 TIMES DAILY
Qty: 60 TABLET | Refills: 3 | Status: SHIPPED | OUTPATIENT
Start: 2025-06-23

## 2025-06-23 RX ORDER — TIRZEPATIDE 5 MG/.5ML
INJECTION, SOLUTION SUBCUTANEOUS
COMMUNITY
Start: 2025-05-23

## 2025-06-23 RX ORDER — BETAMETHASONE SODIUM PHOSPHATE AND BETAMETHASONE ACETATE 3; 3 MG/ML; MG/ML
6 INJECTION, SUSPENSION INTRA-ARTICULAR; INTRALESIONAL; INTRAMUSCULAR; SOFT TISSUE ONCE
Status: COMPLETED | OUTPATIENT
Start: 2025-06-23 | End: 2025-06-23

## 2025-06-23 RX ORDER — LIDOCAINE HYDROCHLORIDE 10 MG/ML
6 INJECTION, SOLUTION INFILTRATION; PERINEURAL ONCE
Status: COMPLETED | OUTPATIENT
Start: 2025-06-23 | End: 2025-06-23

## 2025-06-23 RX ORDER — FOLIC ACID 1 MG/1
TABLET ORAL
COMMUNITY
Start: 2025-05-19

## 2025-06-23 RX ADMIN — LIDOCAINE HYDROCHLORIDE 6 ML: 10 INJECTION, SOLUTION INFILTRATION; PERINEURAL at 10:13

## 2025-06-23 RX ADMIN — BETAMETHASONE SODIUM PHOSPHATE AND BETAMETHASONE ACETATE 6 MG: 3; 3 INJECTION, SUSPENSION INTRA-ARTICULAR; INTRALESIONAL; INTRAMUSCULAR; SOFT TISSUE at 10:13
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
## (undated) DEVICE — SPNG GZ WOVN 4X4IN 12PLY 10/BX STRL

## (undated) DEVICE — TOWEL,OR,DSP,ST,BLUE,DLX,8/PK,10PK/CS: Brand: MEDLINE

## (undated) DEVICE — GLV SURG TRIUMPH ORTHO W/ALOE PF LTX 6.5 STRL

## (undated) DEVICE — PAD GRND REM POLYHESIVE A/ DISP

## (undated) DEVICE — GAUZE,SPONGE,4"X4",16PLY,XRAY,STRL,LF: Brand: MEDLINE

## (undated) DEVICE — SOL IRR NACL 0.9PCT BT 1000ML

## (undated) DEVICE — LIGHT HANDLE: Brand: DEVON

## (undated) DEVICE — PENCL E/S HNDSWCH PUSHBTN HOLSTR 10FT

## (undated) DEVICE — GLV SURG TRIUMPH ORTHO W/ALOE PF LTX 8 STRL

## (undated) DEVICE — UNDRPD BREATH 23X36 BG/10

## (undated) DEVICE — GLV SURG SENSICARE GREEN W/ALOE PF LF 6.5 STRL

## (undated) DEVICE — TUBING, SUCTION, 3/16" X 6', STRAIGHT: Brand: MEDLINE

## (undated) DEVICE — ELECTRD BLD STD SS 3/32X6.5IN

## (undated) DEVICE — CONN TBG Y 5 IN 1 LF STRL